# Patient Record
Sex: MALE | Race: WHITE | NOT HISPANIC OR LATINO
[De-identification: names, ages, dates, MRNs, and addresses within clinical notes are randomized per-mention and may not be internally consistent; named-entity substitution may affect disease eponyms.]

---

## 2018-01-10 ENCOUNTER — APPOINTMENT (OUTPATIENT)
Dept: HEART AND VASCULAR | Facility: CLINIC | Age: 63
End: 2018-01-10
Payer: COMMERCIAL

## 2018-01-10 VITALS
HEART RATE: 123 BPM | WEIGHT: 175 LBS | BODY MASS INDEX: 26.52 KG/M2 | HEIGHT: 68 IN | DIASTOLIC BLOOD PRESSURE: 86 MMHG | SYSTOLIC BLOOD PRESSURE: 122 MMHG

## 2018-01-10 DIAGNOSIS — Z78.9 OTHER SPECIFIED HEALTH STATUS: ICD-10-CM

## 2018-01-10 PROBLEM — Z00.00 ENCOUNTER FOR PREVENTIVE HEALTH EXAMINATION: Status: ACTIVE | Noted: 2018-01-10

## 2018-01-10 PROCEDURE — 93000 ELECTROCARDIOGRAM COMPLETE: CPT

## 2018-01-10 PROCEDURE — 99205 OFFICE O/P NEW HI 60 MIN: CPT | Mod: 25

## 2018-01-10 RX ORDER — BUPROPION HYDROCHLORIDE 150 MG/1
150 TABLET, EXTENDED RELEASE ORAL
Qty: 90 | Refills: 0 | Status: ACTIVE | COMMUNITY
Start: 2017-12-18

## 2018-01-22 ENCOUNTER — FORM ENCOUNTER (OUTPATIENT)
Age: 63
End: 2018-01-22

## 2018-01-23 ENCOUNTER — OUTPATIENT (OUTPATIENT)
Dept: OUTPATIENT SERVICES | Facility: HOSPITAL | Age: 63
LOS: 1 days | Discharge: HOME | End: 2018-01-23
Payer: COMMERCIAL

## 2018-01-23 DIAGNOSIS — F32.9 MAJOR DEPRESSIVE DISORDER, SINGLE EPISODE, UNSPECIFIED: ICD-10-CM

## 2018-01-23 DIAGNOSIS — Z98.890 OTHER SPECIFIED POSTPROCEDURAL STATES: Chronic | ICD-10-CM

## 2018-01-23 DIAGNOSIS — E78.5 HYPERLIPIDEMIA, UNSPECIFIED: ICD-10-CM

## 2018-01-23 DIAGNOSIS — G47.33 OBSTRUCTIVE SLEEP APNEA (ADULT) (PEDIATRIC): ICD-10-CM

## 2018-01-23 DIAGNOSIS — I48.92 UNSPECIFIED ATRIAL FLUTTER: ICD-10-CM

## 2018-01-23 DIAGNOSIS — I10 ESSENTIAL (PRIMARY) HYPERTENSION: ICD-10-CM

## 2018-01-23 LAB
ALBUMIN SERPL ELPH-MCNC: 5.2 G/DL — HIGH (ref 3.3–5)
ALP SERPL-CCNC: 64 U/L — SIGNIFICANT CHANGE UP (ref 40–120)
ALT FLD-CCNC: 45 U/L — SIGNIFICANT CHANGE UP (ref 10–45)
ANION GAP SERPL CALC-SCNC: 14 MMOL/L — SIGNIFICANT CHANGE UP (ref 5–17)
APTT BLD: 32.5 SEC — SIGNIFICANT CHANGE UP (ref 27.5–37.4)
AST SERPL-CCNC: 33 U/L — SIGNIFICANT CHANGE UP (ref 10–40)
BILIRUB SERPL-MCNC: 0.4 MG/DL — SIGNIFICANT CHANGE UP (ref 0.2–1.2)
BLD GP AB SCN SERPL QL: NEGATIVE — SIGNIFICANT CHANGE UP
BUN SERPL-MCNC: 15 MG/DL — SIGNIFICANT CHANGE UP (ref 7–23)
CALCIUM SERPL-MCNC: 10.2 MG/DL — SIGNIFICANT CHANGE UP (ref 8.4–10.5)
CHLORIDE SERPL-SCNC: 94 MMOL/L — LOW (ref 96–108)
CO2 SERPL-SCNC: 30 MMOL/L — SIGNIFICANT CHANGE UP (ref 22–31)
CREAT SERPL-MCNC: 0.97 MG/DL — SIGNIFICANT CHANGE UP (ref 0.5–1.3)
GLUCOSE SERPL-MCNC: 116 MG/DL — HIGH (ref 70–99)
HCT VFR BLD CALC: 47.6 % — SIGNIFICANT CHANGE UP (ref 39–50)
HGB BLD-MCNC: 16.1 G/DL — SIGNIFICANT CHANGE UP (ref 13–17)
INR BLD: 0.98 — SIGNIFICANT CHANGE UP (ref 0.88–1.16)
MCHC RBC-ENTMCNC: 32.7 PG — SIGNIFICANT CHANGE UP (ref 27–34)
MCHC RBC-ENTMCNC: 33.8 G/DL — SIGNIFICANT CHANGE UP (ref 32–36)
MCV RBC AUTO: 96.7 FL — SIGNIFICANT CHANGE UP (ref 80–100)
PLATELET # BLD AUTO: 198 K/UL — SIGNIFICANT CHANGE UP (ref 150–400)
POTASSIUM SERPL-MCNC: 4.5 MMOL/L — SIGNIFICANT CHANGE UP (ref 3.5–5.3)
POTASSIUM SERPL-SCNC: 4.5 MMOL/L — SIGNIFICANT CHANGE UP (ref 3.5–5.3)
PROT SERPL-MCNC: 8.2 G/DL — SIGNIFICANT CHANGE UP (ref 6–8.3)
PROTHROM AB SERPL-ACNC: 10.9 SEC — SIGNIFICANT CHANGE UP (ref 9.8–12.7)
RBC # BLD: 4.92 M/UL — SIGNIFICANT CHANGE UP (ref 4.2–5.8)
RBC # FLD: 12.2 % — SIGNIFICANT CHANGE UP (ref 10.3–16.9)
RH IG SCN BLD-IMP: POSITIVE — SIGNIFICANT CHANGE UP
SODIUM SERPL-SCNC: 138 MMOL/L — SIGNIFICANT CHANGE UP (ref 135–145)
WBC # BLD: 6.6 K/UL — SIGNIFICANT CHANGE UP (ref 3.8–10.5)
WBC # FLD AUTO: 6.6 K/UL — SIGNIFICANT CHANGE UP (ref 3.8–10.5)

## 2018-01-23 PROCEDURE — 80053 COMPREHEN METABOLIC PANEL: CPT

## 2018-01-23 PROCEDURE — 86901 BLOOD TYPING SEROLOGIC RH(D): CPT

## 2018-01-23 PROCEDURE — C1894: CPT

## 2018-01-23 PROCEDURE — C2630: CPT

## 2018-01-23 PROCEDURE — 86900 BLOOD TYPING SEROLOGIC ABO: CPT

## 2018-01-23 PROCEDURE — 85730 THROMBOPLASTIN TIME PARTIAL: CPT

## 2018-01-23 PROCEDURE — 93325 DOPPLER ECHO COLOR FLOW MAPG: CPT | Mod: 26

## 2018-01-23 PROCEDURE — 85610 PROTHROMBIN TIME: CPT

## 2018-01-23 PROCEDURE — 93613 INTRACARDIAC EPHYS 3D MAPG: CPT

## 2018-01-23 PROCEDURE — 93653 COMPRE EP EVAL TX SVT: CPT

## 2018-01-23 PROCEDURE — C1766: CPT

## 2018-01-23 PROCEDURE — 93623 PRGRMD STIMJ&PACG IV RX NFS: CPT

## 2018-01-23 PROCEDURE — 99234 HOSP IP/OBS SM DT SF/LOW 45: CPT

## 2018-01-23 PROCEDURE — 93312 ECHO TRANSESOPHAGEAL: CPT | Mod: 26

## 2018-01-23 PROCEDURE — 93621 COMP EP EVL L PAC&REC C SINS: CPT | Mod: 26

## 2018-01-23 PROCEDURE — 85027 COMPLETE CBC AUTOMATED: CPT

## 2018-01-23 PROCEDURE — 93623 PRGRMD STIMJ&PACG IV RX NFS: CPT | Mod: 26

## 2018-01-23 PROCEDURE — 93656 COMPRE EP EVAL ABLTJ ATR FIB: CPT

## 2018-01-23 PROCEDURE — 93320 DOPPLER ECHO COMPLETE: CPT | Mod: 26

## 2018-01-23 PROCEDURE — 93312 ECHO TRANSESOPHAGEAL: CPT

## 2018-01-23 PROCEDURE — C1730: CPT

## 2018-01-23 PROCEDURE — 86850 RBC ANTIBODY SCREEN: CPT

## 2018-01-23 RX ORDER — ATORVASTATIN CALCIUM 80 MG/1
10 TABLET, FILM COATED ORAL AT BEDTIME
Qty: 0 | Refills: 0 | Status: DISCONTINUED | OUTPATIENT
Start: 2018-01-23 | End: 2018-01-23

## 2018-01-23 RX ORDER — LOSARTAN POTASSIUM 100 MG/1
50 TABLET, FILM COATED ORAL
Qty: 0 | Refills: 0 | Status: DISCONTINUED | OUTPATIENT
Start: 2018-01-23 | End: 2018-01-23

## 2018-01-23 RX ORDER — DULOXETINE HYDROCHLORIDE 30 MG/1
90 CAPSULE, DELAYED RELEASE ORAL DAILY
Qty: 0 | Refills: 0 | Status: DISCONTINUED | OUTPATIENT
Start: 2018-01-23 | End: 2018-01-23

## 2018-01-23 RX ORDER — BUPROPION HYDROCHLORIDE 150 MG/1
150 TABLET, EXTENDED RELEASE ORAL DAILY
Qty: 0 | Refills: 0 | Status: DISCONTINUED | OUTPATIENT
Start: 2018-01-23 | End: 2018-01-23

## 2018-01-23 RX ORDER — ATENOLOL 25 MG/1
25 TABLET ORAL DAILY
Qty: 0 | Refills: 0 | Status: DISCONTINUED | OUTPATIENT
Start: 2018-01-23 | End: 2018-01-23

## 2018-01-23 RX ORDER — APIXABAN 2.5 MG/1
5 TABLET, FILM COATED ORAL
Qty: 0 | Refills: 0 | Status: DISCONTINUED | OUTPATIENT
Start: 2018-01-23 | End: 2018-01-23

## 2018-01-23 RX ORDER — FLUOXETINE HCL 10 MG
40 CAPSULE ORAL DAILY
Qty: 0 | Refills: 0 | Status: DISCONTINUED | OUTPATIENT
Start: 2018-01-23 | End: 2018-01-23

## 2018-01-23 NOTE — H&P ADULT - HISTORY OF PRESENT ILLNESS
Mr. Oliveira is a 62 year old male with sleep apnea, HTN. HLD and atrial flutter s/p cardioversion at OSH in 2015, who presents today for ablation of recurrent atrial flutter.    He states that in 2015 he had asymptomatic atrial flutter and was ultimately cardioverted. He was doing well and notes no limitations in his activity. He saw his pulmonologist last week and was found to be in atrial flutter with a rapid ventricular response. He was referred to Dr. Sams and his Atenolol was increased and he was started on Eliquis. He states while in this arrhythmia he went to the gym without any limitations. He denies any recent stress test or echocardiogram. No palpitations, syncope, near syncope, orthopnea, PND, peripheral edema. He denies fever, chills, palpitations, awareness of rapid heart action, SOB, MABRY. He endorses compliance with Eliquis and all medications. He denies bleeding issues.     01/23/18 LIU: No clot in FLORA. Normal LVEF.

## 2018-01-23 NOTE — H&P ADULT - ASSESSMENT
Mr. Oliveira is a 62 year old male with sleep apnea, HTN. HLD and atrial flutter s/p cardioversion at OSH in 2015, who presents today for ablation of recurrent atrial flutter. He saw his pulmonologist last week and was found to be in atrial flutter with a rapid ventricular response. He was unaware of any rapid heart action and feels well today. He is maintained on Eliquis 5mg BID with no bleeding issues. 01/23/18 LIU: No clot in FLORA. Normal LVEF.

## 2018-01-23 NOTE — H&P ADULT - PMH
Atrial flutter with rapid ventricular response    Essential hypertension    HLD (hyperlipidemia)    FRANCISCO on CPAP

## 2018-01-23 NOTE — H&P ADULT - NSHPLABSRESULTS_GEN_ALL_CORE
01/23/17 LIU   - Normal left ventricular size and wall thickness. The left ventricular wall motion is normal. The left ventricular ejection fraction is normal.  - The left atrium is mildly dilated.   - Right atrial size is normal.  - The right ventricle is normal in size and function. The right ventricular systolic function is normal.  - No evidence for any hemodynamically significant valvular disease.  - No clot seen in the left atrium or in the left atrial appendage. Left atrial appendage emptying velocities are normal.  - There is no pericardial effusion.  - There is no significant atherosclerotic plaque(s) in the aortic arch. There is no significant atherosclerotic plaque(s) in the descending aorta.                               16.1   6.6   )-----------( 198      ( 23 Jan 2018 08:15 )             47.6       01-23    138  |  94<L>  |  15  ----------------------------<  116<H>  4.5   |  30  |  0.97    Ca    10.2      23 Jan 2018 08:15    TPro  8.2  /  Alb  5.2<H>  /  TBili  0.4  /  DBili  x   /  AST  33  /  ALT  45  /  AlkPhos  64  01-23      PT/INR - ( 23 Jan 2018 08:15 )   PT: 10.9 sec;   INR: 0.98     PTT - ( 23 Jan 2018 08:15 )  PTT:32.5 sec

## 2018-01-23 NOTE — H&P ADULT - PROBLEM SELECTOR PLAN 1
- Patient is scheduled to undergo aflutter ablation today with Dr. Calix.  - Continue Eliquis (restart 6 hours post EP procedure) - Patient is scheduled to undergo atrial flutter ablation today with Dr. Calix.  - Continue Eliquis 5mg BID (restart 6 hours post EP procedure)

## 2018-03-05 ENCOUNTER — APPOINTMENT (OUTPATIENT)
Dept: HEART AND VASCULAR | Facility: CLINIC | Age: 63
End: 2018-03-05
Payer: COMMERCIAL

## 2018-03-05 PROCEDURE — 99213 OFFICE O/P EST LOW 20 MIN: CPT

## 2019-03-08 PROBLEM — I48.92 UNSPECIFIED ATRIAL FLUTTER: Chronic | Status: ACTIVE | Noted: 2018-01-23

## 2019-03-08 PROBLEM — E78.5 HYPERLIPIDEMIA, UNSPECIFIED: Chronic | Status: ACTIVE | Noted: 2018-01-23

## 2019-03-08 PROBLEM — I10 ESSENTIAL (PRIMARY) HYPERTENSION: Chronic | Status: ACTIVE | Noted: 2018-01-23

## 2019-03-08 PROBLEM — G47.33 OBSTRUCTIVE SLEEP APNEA (ADULT) (PEDIATRIC): Chronic | Status: ACTIVE | Noted: 2018-01-23

## 2019-03-11 ENCOUNTER — NON-APPOINTMENT (OUTPATIENT)
Age: 64
End: 2019-03-11

## 2019-03-11 ENCOUNTER — RECORD ABSTRACTING (OUTPATIENT)
Age: 64
End: 2019-03-11

## 2019-03-11 ENCOUNTER — APPOINTMENT (OUTPATIENT)
Dept: CARDIOLOGY | Facility: CLINIC | Age: 64
End: 2019-03-11
Payer: COMMERCIAL

## 2019-03-11 VITALS
SYSTOLIC BLOOD PRESSURE: 120 MMHG | WEIGHT: 176 LBS | HEART RATE: 96 BPM | BODY MASS INDEX: 26.67 KG/M2 | DIASTOLIC BLOOD PRESSURE: 80 MMHG | HEIGHT: 68 IN

## 2019-03-11 DIAGNOSIS — Z86.010 PERSONAL HISTORY OF COLONIC POLYPS: ICD-10-CM

## 2019-03-11 DIAGNOSIS — Z86.19 PERSONAL HISTORY OF OTHER INFECTIOUS AND PARASITIC DISEASES: ICD-10-CM

## 2019-03-11 DIAGNOSIS — K76.0 FATTY (CHANGE OF) LIVER, NOT ELSEWHERE CLASSIFIED: ICD-10-CM

## 2019-03-11 DIAGNOSIS — Z82.49 FAMILY HISTORY OF ISCHEMIC HEART DISEASE AND OTHER DISEASES OF THE CIRCULATORY SYSTEM: ICD-10-CM

## 2019-03-11 DIAGNOSIS — Z87.891 PERSONAL HISTORY OF NICOTINE DEPENDENCE: ICD-10-CM

## 2019-03-11 DIAGNOSIS — R74.8 ABNORMAL LEVELS OF OTHER SERUM ENZYMES: ICD-10-CM

## 2019-03-11 DIAGNOSIS — Z80.0 FAMILY HISTORY OF MALIGNANT NEOPLASM OF DIGESTIVE ORGANS: ICD-10-CM

## 2019-03-11 DIAGNOSIS — F41.9 ANXIETY DISORDER, UNSPECIFIED: ICD-10-CM

## 2019-03-11 DIAGNOSIS — J98.6 DISORDERS OF DIAPHRAGM: ICD-10-CM

## 2019-03-11 DIAGNOSIS — Z87.898 PERSONAL HISTORY OF OTHER SPECIFIED CONDITIONS: ICD-10-CM

## 2019-03-11 DIAGNOSIS — Z86.79 PERSONAL HISTORY OF OTHER DISEASES OF THE CIRCULATORY SYSTEM: ICD-10-CM

## 2019-03-11 PROCEDURE — 99213 OFFICE O/P EST LOW 20 MIN: CPT

## 2019-03-11 PROCEDURE — 93000 ELECTROCARDIOGRAM COMPLETE: CPT

## 2019-03-11 NOTE — PHYSICAL EXAM
[General Appearance - Well Developed] : well developed [Normal Appearance] : normal appearance [Well Groomed] : well groomed [General Appearance - Well Nourished] : well nourished [No Deformities] : no deformities [General Appearance - In No Acute Distress] : no acute distress [Normal Conjunctiva] : the conjunctiva exhibited no abnormalities [Eyelids - No Xanthelasma] : the eyelids demonstrated no xanthelasmas [Normal Oral Mucosa] : normal oral mucosa [No Oral Pallor] : no oral pallor [No Oral Cyanosis] : no oral cyanosis [Normal Jugular Venous A Waves Present] : normal jugular venous A waves present [Normal Jugular Venous V Waves Present] : normal jugular venous V waves present [No Jugular Venous Amezquita A Waves] : no jugular venous amezquita A waves [Respiration, Rhythm And Depth] : normal respiratory rhythm and effort [Exaggerated Use Of Accessory Muscles For Inspiration] : no accessory muscle use [Auscultation Breath Sounds / Voice Sounds] : lungs were clear to auscultation bilaterally [Heart Rate And Rhythm] : heart rate and rhythm were normal [Heart Sounds] : normal S1 and S2 [Murmurs] : no murmurs present [Abdomen Soft] : soft [Abdomen Tenderness] : non-tender [] : no hepato-splenomegaly [Abdomen Mass (___ Cm)] : no abdominal mass palpated [Abnormal Walk] : normal gait [Gait - Sufficient For Exercise Testing] : the gait was sufficient for exercise testing [Oriented To Time, Place, And Person] : oriented to person, place, and time [Affect] : the affect was normal [Mood] : the mood was normal [No Anxiety] : not feeling anxious

## 2019-03-11 NOTE — REASON FOR VISIT
[FreeTextEntry1] : The patient was recently discovered by his primary care to have had recurrence of atrial flutter. This is the first known episode following a therapeutic ablation 2 years ago. Patient had also had a successful cardioversion prior to the ablation. He was started on anticoagulation with ELIQUIS by his primary care which will be continued. The patient is completely asymptomatic and is not aware of the arrhythmia. He has had no symptoms of chest pain shortness of breath dizziness lightheadedness or palpitations

## 2019-03-11 NOTE — DISCUSSION/SUMMARY
[FreeTextEntry1] : The electrocardiogram reveals atrial flutter with moderate ventricular rate right-sided conduction delay. The patient is status post ablation in the past for atrial flutter/fibrillation. His condition is currently stable and asymptomatic. I have recommended that the issue be addressed either by repeat cardioversion or repeat consultation with his electrophysiologist in order to that the best possible therapeutic strategy. Possibly another ablation would be indicated in this case. No anticoagulation will be continued The patient will be considering the pros therapeutic options.

## 2019-04-01 ENCOUNTER — APPOINTMENT (OUTPATIENT)
Dept: HEART AND VASCULAR | Facility: CLINIC | Age: 64
End: 2019-04-01
Payer: COMMERCIAL

## 2019-04-01 VITALS
SYSTOLIC BLOOD PRESSURE: 130 MMHG | DIASTOLIC BLOOD PRESSURE: 98 MMHG | WEIGHT: 175 LBS | HEART RATE: 75 BPM | OXYGEN SATURATION: 99 % | BODY MASS INDEX: 26.52 KG/M2 | HEIGHT: 68 IN

## 2019-04-01 PROCEDURE — 99214 OFFICE O/P EST MOD 30 MIN: CPT

## 2019-04-01 RX ORDER — AMOXICILLIN 875 MG/1
TABLET, FILM COATED ORAL
Refills: 0 | Status: DISCONTINUED | COMMUNITY
End: 2019-04-01

## 2019-04-01 RX ORDER — DIGOXIN 125 MCG
0.12 TABLET ORAL
Refills: 0 | Status: DISCONTINUED | COMMUNITY
End: 2019-04-01

## 2019-04-01 RX ORDER — ASPIRIN 81 MG/1
81 TABLET, CHEWABLE ORAL
Refills: 0 | Status: DISCONTINUED | COMMUNITY
End: 2019-04-01

## 2019-04-01 NOTE — PHYSICAL EXAM
[General Appearance - Well Developed] : well developed [Normal Appearance] : normal appearance [Well Groomed] : well groomed [General Appearance - Well Nourished] : well nourished [No Deformities] : no deformities [General Appearance - In No Acute Distress] : no acute distress [Normal Conjunctiva] : the conjunctiva exhibited no abnormalities [Normal Oral Mucosa] : normal oral mucosa [Normal Jugular Venous V Waves Present] : normal jugular venous V waves present [Respiration, Rhythm And Depth] : normal respiratory rhythm and effort [Exaggerated Use Of Accessory Muscles For Inspiration] : no accessory muscle use [Auscultation Breath Sounds / Voice Sounds] : lungs were clear to auscultation bilaterally [Heart Rate And Rhythm] : heart rate and rhythm were normal [Heart Sounds] : normal S1 and S2 [Abdomen Soft] : soft [Abnormal Walk] : normal gait [Gait - Sufficient For Exercise Testing] : the gait was sufficient for exercise testing [Cyanosis, Localized] : no localized cyanosis [] : no rash [Oriented To Time, Place, And Person] : oriented to person, place, and time [Impaired Insight] : insight and judgment were intact [Affect] : the affect was normal [Mood] : the mood was normal [No Anxiety] : not feeling anxious [Normal] : normal [Apical Thrill] : no thrill palpable at the apex [Click] : no click [Pericardial Rub] : no pericardial rub

## 2019-04-01 NOTE — DISCUSSION/SUMMARY
[FreeTextEntry1] : Atypical flutter post typical flutter ablation. Explained that this is likely a left atrial procedure. Discussed options on cardioverions with/without antiarrhtyhmics and ablation. He elected ablaiton. Quoted an 80% chance for success and <1:1000 chance of a major complications. He expressed understanding and agreed to proceed with ablations. THis will be done with uninterupted anticoagulation.

## 2019-04-01 NOTE — HISTORY OF PRESENT ILLNESS
[FreeTextEntry1] : Mr. Oliveira is s/p ablation for typical flutter on 1/23/2018. He feels great, denies, palpitations, syncope, near syncope, orthopnea, PND, peripheral edema.  \par He states that his energy has significantly increased post ablation, however he was recently diagnosed with flutter during routine check. He was started on anticoagulation 3/11. Symptoms include very mild fatigue. \par   \par

## 2019-04-11 ENCOUNTER — INPATIENT (INPATIENT)
Facility: HOSPITAL | Age: 64
LOS: 0 days | Discharge: HOME | DRG: 274 | End: 2019-04-12
Attending: INTERNAL MEDICINE | Admitting: INTERNAL MEDICINE
Payer: COMMERCIAL

## 2019-04-11 VITALS — WEIGHT: 173.06 LBS | HEIGHT: 68 IN

## 2019-04-11 DIAGNOSIS — I10 ESSENTIAL (PRIMARY) HYPERTENSION: ICD-10-CM

## 2019-04-11 DIAGNOSIS — I48.91 UNSPECIFIED ATRIAL FIBRILLATION: ICD-10-CM

## 2019-04-11 DIAGNOSIS — Z98.890 OTHER SPECIFIED POSTPROCEDURAL STATES: Chronic | ICD-10-CM

## 2019-04-11 DIAGNOSIS — G47.33 OBSTRUCTIVE SLEEP APNEA (ADULT) (PEDIATRIC): ICD-10-CM

## 2019-04-11 DIAGNOSIS — E78.5 HYPERLIPIDEMIA, UNSPECIFIED: ICD-10-CM

## 2019-04-11 DIAGNOSIS — R63.8 OTHER SYMPTOMS AND SIGNS CONCERNING FOOD AND FLUID INTAKE: ICD-10-CM

## 2019-04-11 LAB
ANION GAP SERPL CALC-SCNC: 10 MMOL/L — SIGNIFICANT CHANGE UP (ref 5–17)
ANION GAP SERPL CALC-SCNC: 12 MMOL/L — SIGNIFICANT CHANGE UP (ref 5–17)
APTT BLD: 32.3 SEC — SIGNIFICANT CHANGE UP (ref 27.5–36.3)
BLD GP AB SCN SERPL QL: NEGATIVE — SIGNIFICANT CHANGE UP
BUN SERPL-MCNC: 15 MG/DL — SIGNIFICANT CHANGE UP (ref 7–23)
BUN SERPL-MCNC: 17 MG/DL — SIGNIFICANT CHANGE UP (ref 7–23)
CALCIUM SERPL-MCNC: 10.2 MG/DL — SIGNIFICANT CHANGE UP (ref 8.4–10.5)
CALCIUM SERPL-MCNC: 7.6 MG/DL — LOW (ref 8.4–10.5)
CHLORIDE SERPL-SCNC: 102 MMOL/L — SIGNIFICANT CHANGE UP (ref 96–108)
CHLORIDE SERPL-SCNC: 112 MMOL/L — HIGH (ref 96–108)
CO2 SERPL-SCNC: 23 MMOL/L — SIGNIFICANT CHANGE UP (ref 22–31)
CO2 SERPL-SCNC: 27 MMOL/L — SIGNIFICANT CHANGE UP (ref 22–31)
CREAT SERPL-MCNC: 0.92 MG/DL — SIGNIFICANT CHANGE UP (ref 0.5–1.3)
CREAT SERPL-MCNC: 1.03 MG/DL — SIGNIFICANT CHANGE UP (ref 0.5–1.3)
GLUCOSE SERPL-MCNC: 116 MG/DL — HIGH (ref 70–99)
GLUCOSE SERPL-MCNC: 129 MG/DL — HIGH (ref 70–99)
HCT VFR BLD CALC: 33.4 % — LOW (ref 39–50)
HCT VFR BLD CALC: 35.5 % — LOW (ref 39–50)
HCT VFR BLD CALC: 48.8 % — SIGNIFICANT CHANGE UP (ref 39–50)
HGB BLD-MCNC: 11.1 G/DL — LOW (ref 13–17)
HGB BLD-MCNC: 11.8 G/DL — LOW (ref 13–17)
HGB BLD-MCNC: 16.1 G/DL — SIGNIFICANT CHANGE UP (ref 13–17)
INR BLD: 1.12 — SIGNIFICANT CHANGE UP (ref 0.88–1.16)
LACTATE SERPL-SCNC: 1.2 MMOL/L — SIGNIFICANT CHANGE UP (ref 0.5–2)
MAGNESIUM SERPL-MCNC: 1.6 MG/DL — SIGNIFICANT CHANGE UP (ref 1.6–2.6)
MCHC RBC-ENTMCNC: 33 GM/DL — SIGNIFICANT CHANGE UP (ref 32–36)
MCHC RBC-ENTMCNC: 33.2 GM/DL — SIGNIFICANT CHANGE UP (ref 32–36)
MCHC RBC-ENTMCNC: 33.2 GM/DL — SIGNIFICANT CHANGE UP (ref 32–36)
MCHC RBC-ENTMCNC: 33.5 PG — SIGNIFICANT CHANGE UP (ref 27–34)
MCHC RBC-ENTMCNC: 34.3 PG — HIGH (ref 27–34)
MCHC RBC-ENTMCNC: 34.4 PG — HIGH (ref 27–34)
MCV RBC AUTO: 101.7 FL — HIGH (ref 80–100)
MCV RBC AUTO: 103.1 FL — HIGH (ref 80–100)
MCV RBC AUTO: 103.5 FL — HIGH (ref 80–100)
NRBC # BLD: 0 /100 WBCS — SIGNIFICANT CHANGE UP (ref 0–0)
PLATELET # BLD AUTO: 141 K/UL — LOW (ref 150–400)
PLATELET # BLD AUTO: 148 K/UL — LOW (ref 150–400)
PLATELET # BLD AUTO: 216 K/UL — SIGNIFICANT CHANGE UP (ref 150–400)
POTASSIUM SERPL-MCNC: 4.4 MMOL/L — SIGNIFICANT CHANGE UP (ref 3.5–5.3)
POTASSIUM SERPL-MCNC: 4.9 MMOL/L — SIGNIFICANT CHANGE UP (ref 3.5–5.3)
POTASSIUM SERPL-SCNC: 4.4 MMOL/L — SIGNIFICANT CHANGE UP (ref 3.5–5.3)
POTASSIUM SERPL-SCNC: 4.9 MMOL/L — SIGNIFICANT CHANGE UP (ref 3.5–5.3)
PROTHROM AB SERPL-ACNC: 12.7 SEC — SIGNIFICANT CHANGE UP (ref 10–12.9)
RBC # BLD: 3.24 M/UL — LOW (ref 4.2–5.8)
RBC # BLD: 3.43 M/UL — LOW (ref 4.2–5.8)
RBC # BLD: 4.8 M/UL — SIGNIFICANT CHANGE UP (ref 4.2–5.8)
RBC # FLD: 12.2 % — SIGNIFICANT CHANGE UP (ref 10.3–14.5)
RBC # FLD: 12.3 % — SIGNIFICANT CHANGE UP (ref 10.3–14.5)
RBC # FLD: 12.4 % — SIGNIFICANT CHANGE UP (ref 10.3–14.5)
RH IG SCN BLD-IMP: POSITIVE — SIGNIFICANT CHANGE UP
SODIUM SERPL-SCNC: 141 MMOL/L — SIGNIFICANT CHANGE UP (ref 135–145)
SODIUM SERPL-SCNC: 145 MMOL/L — SIGNIFICANT CHANGE UP (ref 135–145)
WBC # BLD: 6.31 K/UL — SIGNIFICANT CHANGE UP (ref 3.8–10.5)
WBC # BLD: 6.71 K/UL — SIGNIFICANT CHANGE UP (ref 3.8–10.5)
WBC # BLD: 9.42 K/UL — SIGNIFICANT CHANGE UP (ref 3.8–10.5)
WBC # FLD AUTO: 6.31 K/UL — SIGNIFICANT CHANGE UP (ref 3.8–10.5)
WBC # FLD AUTO: 6.71 K/UL — SIGNIFICANT CHANGE UP (ref 3.8–10.5)
WBC # FLD AUTO: 9.42 K/UL — SIGNIFICANT CHANGE UP (ref 3.8–10.5)

## 2019-04-11 PROCEDURE — 93653 COMPRE EP EVAL TX SVT: CPT

## 2019-04-11 PROCEDURE — 93662 INTRACARDIAC ECG (ICE): CPT | Mod: 26

## 2019-04-11 PROCEDURE — 99223 1ST HOSP IP/OBS HIGH 75: CPT

## 2019-04-11 PROCEDURE — 93613 INTRACARDIAC EPHYS 3D MAPG: CPT

## 2019-04-11 PROCEDURE — 74176 CT ABD & PELVIS W/O CONTRAST: CPT | Mod: 26

## 2019-04-11 PROCEDURE — 93621 COMP EP EVL L PAC&REC C SINS: CPT | Mod: 26

## 2019-04-11 PROCEDURE — 93462 L HRT CATH TRNSPTL PUNCTURE: CPT

## 2019-04-11 PROCEDURE — 93655 ICAR CATH ABLTJ DSCRT ARRHYT: CPT

## 2019-04-11 PROCEDURE — 93010 ELECTROCARDIOGRAM REPORT: CPT

## 2019-04-11 RX ORDER — ATENOLOL 25 MG/1
25 TABLET ORAL DAILY
Qty: 0 | Refills: 0 | Status: DISCONTINUED | OUTPATIENT
Start: 2019-04-11 | End: 2019-04-11

## 2019-04-11 RX ORDER — APIXABAN 2.5 MG/1
1 TABLET, FILM COATED ORAL
Qty: 0 | Refills: 0 | COMMUNITY

## 2019-04-11 RX ORDER — FLUOXETINE HCL 10 MG
1 CAPSULE ORAL
Qty: 0 | Refills: 0 | COMMUNITY

## 2019-04-11 RX ORDER — SODIUM CHLORIDE 9 MG/ML
1000 INJECTION INTRAMUSCULAR; INTRAVENOUS; SUBCUTANEOUS ONCE
Qty: 0 | Refills: 0 | Status: COMPLETED | OUTPATIENT
Start: 2019-04-11 | End: 2019-04-11

## 2019-04-11 RX ORDER — APIXABAN 2.5 MG/1
5 TABLET, FILM COATED ORAL
Qty: 0 | Refills: 0 | Status: DISCONTINUED | OUTPATIENT
Start: 2019-04-11 | End: 2019-04-11

## 2019-04-11 RX ORDER — ATORVASTATIN CALCIUM 80 MG/1
1 TABLET, FILM COATED ORAL
Qty: 0 | Refills: 0 | COMMUNITY

## 2019-04-11 RX ORDER — BUPROPION HYDROCHLORIDE 150 MG/1
1 TABLET, EXTENDED RELEASE ORAL
Qty: 0 | Refills: 0 | COMMUNITY

## 2019-04-11 RX ORDER — LOSARTAN POTASSIUM 100 MG/1
1 TABLET, FILM COATED ORAL
Qty: 0 | Refills: 0 | COMMUNITY

## 2019-04-11 RX ORDER — PANTOPRAZOLE SODIUM 20 MG/1
40 TABLET, DELAYED RELEASE ORAL
Qty: 0 | Refills: 0 | Status: DISCONTINUED | OUTPATIENT
Start: 2019-04-11 | End: 2019-04-12

## 2019-04-11 RX ORDER — FLUOXETINE HCL 10 MG
40 CAPSULE ORAL DAILY
Qty: 0 | Refills: 0 | Status: DISCONTINUED | OUTPATIENT
Start: 2019-04-11 | End: 2019-04-12

## 2019-04-11 RX ORDER — LOSARTAN POTASSIUM 100 MG/1
50 TABLET, FILM COATED ORAL DAILY
Qty: 0 | Refills: 0 | Status: DISCONTINUED | OUTPATIENT
Start: 2019-04-11 | End: 2019-04-11

## 2019-04-11 RX ORDER — BUPROPION HYDROCHLORIDE 150 MG/1
150 TABLET, EXTENDED RELEASE ORAL DAILY
Qty: 0 | Refills: 0 | Status: DISCONTINUED | OUTPATIENT
Start: 2019-04-11 | End: 2019-04-12

## 2019-04-11 RX ORDER — ATORVASTATIN CALCIUM 80 MG/1
10 TABLET, FILM COATED ORAL DAILY
Qty: 0 | Refills: 0 | Status: DISCONTINUED | OUTPATIENT
Start: 2019-04-11 | End: 2019-04-12

## 2019-04-11 RX ORDER — MAGNESIUM SULFATE 500 MG/ML
4 VIAL (ML) INJECTION ONCE
Qty: 0 | Refills: 0 | Status: COMPLETED | OUTPATIENT
Start: 2019-04-11 | End: 2019-04-11

## 2019-04-11 RX ORDER — ATENOLOL 25 MG/1
1 TABLET ORAL
Qty: 0 | Refills: 0 | COMMUNITY

## 2019-04-11 RX ORDER — PHENYLEPHRINE HYDROCHLORIDE 10 MG/ML
0.3 INJECTION INTRAVENOUS
Qty: 40 | Refills: 0 | Status: DISCONTINUED | OUTPATIENT
Start: 2019-04-11 | End: 2019-04-12

## 2019-04-11 RX ADMIN — SODIUM CHLORIDE 4000 MILLILITER(S): 9 INJECTION INTRAMUSCULAR; INTRAVENOUS; SUBCUTANEOUS at 18:25

## 2019-04-11 RX ADMIN — Medication 100 GRAM(S): at 21:19

## 2019-04-11 NOTE — H&P ADULT - ASSESSMENT
64 yo M with history of HTN, HLD, anxiety, sleep apnea,  atrial flutter, s/p ablation on 1/23/2018 was noted to be in atrial fibrillation on a routine medical follow up.  Today patient presents for an ablation.

## 2019-04-11 NOTE — H&P ADULT - NSICDXPASTMEDICALHX_GEN_ALL_CORE_FT
PAST MEDICAL HISTORY:  Atrial flutter with rapid ventricular response     Essential hypertension     HLD (hyperlipidemia)     FRANCISCO on CPAP

## 2019-04-11 NOTE — PROGRESS NOTE ADULT - PROBLEM SELECTOR PLAN 5
F: s/p 2L NS boluses   E: Replete for K<4 Mag<2  N: DASH/TLC diet  A: Eliquis      FULL CODE  Dispo: CCU

## 2019-04-11 NOTE — PROGRESS NOTE ADULT - SUBJECTIVE AND OBJECTIVE BOX
HOSPITAL COURSE:  63yoM with PMH of HTN, HLD, Anxiety, FRANCISCO, Aflutter, and recent Afib admitted 4/11 for planned ablation. Procedure with no complications patient requiring phenylephrine infusion during procedure as per anesthesiologist. Patient post-procedure was stable asymptomatic. Patient arrived s/p ablation with Anesthesiologist/nurses at bedside, and found to have BP 70/40s seen on Arterial line (afebrile, HR 70s, saturating 100% on 2L NC). Patient AAOx3, warm well perfused throughout, denied any current symptoms of LTH, dizziness, palpitations, SOB, pain at the groin sites bilaterally, or abdominal pain. Pt did endorse he did not urinate since yesterday (with [place] cath in found to have >300cc urine). Patient given 1L NS bolus in pressurized bag and given bolus/started on phenylephrine infusion with improvement in BPs 100/60s. Etiology, patient likely hypovolemic in setting of decreased PO intake as well as effects from general anesthesia. Plan to continue monitoring BPs closely and obtained cbc, bmp, mag, lactate and EKG.    VITAL SIGNS:  Vital Signs Last 24 Hrs  T(C): 35.3 (11 Apr 2019 17:42), Max: 35.3 (11 Apr 2019 17:42)  T(F): 95.6 (11 Apr 2019 17:42), Max: 95.6 (11 Apr 2019 17:42)  HR: 80 (11 Apr 2019 17:42) (80 - 80)  BP: 67/48 (11 Apr 2019 17:42) (67/48 - 67/48)  BP(mean): 53 (11 Apr 2019 17:42) (53 - 53)  RR: 14 (11 Apr 2019 17:42) (14 - 14)  SpO2: 94% (11 Apr 2019 17:42) (94% - 94%)    PHYSICAL EXAM:  General: Elderly  male in NAD  HEENT: NC/AT; PERRL, mucus membranes dry   Neck: supple  Cardiovascular: +S1/S2, RRR no murmurs appreciated   Respiratory: scattered ronchi on left   Gastrointestinal: soft, NT/ND; +BSx4; b/l groin sites nontender no hematoma appreciated   Extremities: WWP; no edema, b/l   Vascular: 2+ radial, DP pulses B/L  Neurological: AAOx3; no focal deficits    MEDICATIONS:  MEDICATIONS  (STANDING):  apixaban 5 milliGRAM(s) Oral two times a day  ATENolol  Tablet 25 milliGRAM(s) Oral daily  atorvastatin 10 milliGRAM(s) Oral daily  buPROPion XL . 150 milliGRAM(s) Oral daily  FLUoxetine 40 milliGRAM(s) Oral daily  losartan 50 milliGRAM(s) Oral daily  pantoprazole    Tablet 40 milliGRAM(s) Oral before breakfast  phenylephrine    Infusion 0.3 MICROgram(s)/kG/Min (8.831 mL/Hr) IV Continuous <Continuous>  sodium chloride 0.9% Bolus 1000 milliLiter(s) IV Bolus once    MEDICATIONS  (PRN):      ALLERGIES:  Allergies    No Known Allergies    Intolerances        LABS:                        16.1   6.31  )-----------( 216      ( 11 Apr 2019 12:25 )             48.8     04-11    141  |  102  |  17  ----------------------------<  129<H>  4.9   |  27  |  1.03    Ca    10.2      11 Apr 2019 12:25      PT/INR - ( 11 Apr 2019 12:25 )   PT: 12.7 sec;   INR: 1.12          PTT - ( 11 Apr 2019 12:25 )  PTT:32.3 sec    CAPILLARY BLOOD GLUCOSE          RADIOLOGY & ADDITIONAL TESTS: Reviewed. HOSPITAL COURSE:  63yoM with PMH of HTN, HLD, Anxiety, FRANCISCO, Aflutter, and recent Afib admitted 4/11 for planned ablation. Procedure with no complications patient requiring phenylephrine infusion during procedure as per anesthesiologist. Patient post-procedure was stable asymptomatic. Patient arrived s/p ablation with Anesthesiologist/nurses at bedside, and found to have BP 70/40s seen on Arterial line (afebrile, HR 70s, saturating 100% on 2L NC). Patient AAOx3, warm well perfused throughout, denied any current symptoms of LTH, dizziness, palpitations, SOB, pain at the groin sites bilaterally, or abdominal pain. Pt did endorse he did not urinate since yesterday (with [place] cath in found to have >300cc urine). Patient given 1L NS bolus in pressurized bag and given bolus/started on phenylephrine infusion with improvement in BPs 100/60s. Etiology, patient likely hypovolemic in setting of decreased PO intake as well as effects from general anesthesia. Plan to continue monitoring BPs closely and obtained cbc, bmp, mag, lactate and EKG.    VITAL SIGNS:  Vital Signs Last 24 Hrs  T(C): 35.3 (11 Apr 2019 17:42), Max: 35.3 (11 Apr 2019 17:42)  T(F): 95.6 (11 Apr 2019 17:42), Max: 95.6 (11 Apr 2019 17:42)  HR: 80 (11 Apr 2019 17:42) (80 - 80)  BP: 67/48 (11 Apr 2019 17:42) (67/48 - 67/48)  BP(mean): 53 (11 Apr 2019 17:42) (53 - 53)  RR: 14 (11 Apr 2019 17:42) (14 - 14)  SpO2: 94% (11 Apr 2019 17:42) (94% - 94%)    PHYSICAL EXAM:  General: Elderly  male in NAD  HEENT: NC/AT; PERRL, mucus membranes dry   Neck: supple  Cardiovascular: +S1/S2, RRR no murmurs appreciated   Respiratory: scattered ronchi on left   Gastrointestinal: soft, NT/ND; +BSx4; b/l groin sites nontender no hematoma appreciated   : [place] cath in place no current urine   Extremities: WWP; no edema, b/l   Vascular: 2+ radial, DP pulses B/L  Neurological: AAOx3; no focal deficits    MEDICATIONS:  MEDICATIONS  (STANDING):  apixaban 5 milliGRAM(s) Oral two times a day  ATENolol  Tablet 25 milliGRAM(s) Oral daily  atorvastatin 10 milliGRAM(s) Oral daily  buPROPion XL . 150 milliGRAM(s) Oral daily  FLUoxetine 40 milliGRAM(s) Oral daily  losartan 50 milliGRAM(s) Oral daily  pantoprazole    Tablet 40 milliGRAM(s) Oral before breakfast  phenylephrine    Infusion 0.3 MICROgram(s)/kG/Min (8.831 mL/Hr) IV Continuous <Continuous>  sodium chloride 0.9% Bolus 1000 milliLiter(s) IV Bolus once    MEDICATIONS  (PRN):      ALLERGIES:  Allergies    No Known Allergies    Intolerances        LABS:                        16.1   6.31  )-----------( 216      ( 11 Apr 2019 12:25 )             48.8     04-11    141  |  102  |  17  ----------------------------<  129<H>  4.9   |  27  |  1.03    Ca    10.2      11 Apr 2019 12:25      PT/INR - ( 11 Apr 2019 12:25 )   PT: 12.7 sec;   INR: 1.12          PTT - ( 11 Apr 2019 12:25 )  PTT:32.3 sec    CAPILLARY BLOOD GLUCOSE          RADIOLOGY & ADDITIONAL TESTS: Reviewed.

## 2019-04-11 NOTE — H&P ADULT - HISTORY OF PRESENT ILLNESS
62 yo M with history of HTN, HLD, anxiety, sleep apnea,  atrial flutter, s/p ablation on 1/23/2018 was noted to be in atrial fibrillation on a routine medical follow up.  Diagnosis was confirmed by ECG. Today patient presents for an ablation.    He feels great, denies, palpitations, syncope, near syncope, orthopnea, PND, peripheral edema.     He was started on anticoagulation 3/11, he has been compliant with his medications.  Symptoms include very mild fatigue.

## 2019-04-11 NOTE — CHART NOTE - NSCHARTNOTEFT_GEN_A_CORE
63yoM with PMH of HTN, HLD, Anxiety, FRANCISCO, Aflutter, and recent Afib admitted/ s/p planned ablation> 63yoM with PMH of HTN, HLD, Anxiety, FRANCISCO, Aflutter, and recent Afib admitted/ s/p planned ablation.    5:40pm: Patient arrived s/p ablation with Anesthesiologist/nurses at bedside, and found to have BP 70/40s seen on Arterial line (afebrile, HR 70s, saturating 100% on 2L NC). Patient AAOx3, warm well perfused throughout, denied any current symptoms of LTH, dizziness, palpitations, SOB, pain at the groin sites bilaterally, or abdominal pain. Pt did endorse he did not urinate since yesterday (with [place] cath in found to have >300cc urine). Patient given 1L NS bolus in pressurized bag and given bolus/started on phenylephrine infusion with improvement in BPs 100/60s. Etiology, patient likely hypovolemic in setting of decreased PO intake as well as effects from general anesthesia. Plan to continue monitoring BPs closely and obtained cbc, bmp, mag, lactate and EKG.

## 2019-04-11 NOTE — H&P ADULT - NSICDXFAMILYHX_GEN_ALL_CORE_FT
FAMILY HISTORY:  Father  Still living? Unknown  Family history of early CAD, Age at diagnosis: Age Unknown

## 2019-04-11 NOTE — PROGRESS NOTE ADULT - ASSESSMENT
63yoM with PMH of HTN, HLD, Anxiety, FRANCISCO, Aflutter, and recent Afib admitted 4/11 for planned ablation with course c/b post hypotension requiring IVF and phenylephrine infusion.

## 2019-04-11 NOTE — H&P ADULT - PROBLEM SELECTOR PLAN 1
EPS/ablation today, will monitor on telemetry overnight, continue atenolol   Eliquis tonight, bed rest

## 2019-04-11 NOTE — PROGRESS NOTE ADULT - PROBLEM SELECTOR PLAN 1
PMH of Aflutter, with recent Afib; S/p planned ablation 4/11 with post procedure EKG NSR nL axis and RBBB.   - F/u EP recs   - bed rest 6 hrs until 12am   - monitor groin sites   - TOV (with [place] sebastian cath)  - Eliquis tonight 1030pm    #Hypotension  Pt with sBPs 70s s/p 1L NS bolus in pressurized bag and given bolus/started on phenylephrine infusion with improvement in BPs 100/60s. Etiology, patient likely hypovolemic in setting of decreased PO intake as well as effects from general anesthesia.   - Patient again sBP 60s while on phenylephrine gtt, given 2nd 1L NS bolus and increased drip rate with improvement   - Lactate 1.2   - Likely not an RP bleed as patient with no pain, but will keep in differential

## 2019-04-12 ENCOUNTER — TRANSCRIPTION ENCOUNTER (OUTPATIENT)
Age: 64
End: 2019-04-12

## 2019-04-12 VITALS
RESPIRATION RATE: 27 BRPM | OXYGEN SATURATION: 97 % | DIASTOLIC BLOOD PRESSURE: 64 MMHG | HEART RATE: 88 BPM | SYSTOLIC BLOOD PRESSURE: 93 MMHG

## 2019-04-12 LAB
ANION GAP SERPL CALC-SCNC: 9 MMOL/L — SIGNIFICANT CHANGE UP (ref 5–17)
BUN SERPL-MCNC: 13 MG/DL — SIGNIFICANT CHANGE UP (ref 7–23)
CALCIUM SERPL-MCNC: 7.8 MG/DL — LOW (ref 8.4–10.5)
CHLORIDE SERPL-SCNC: 106 MMOL/L — SIGNIFICANT CHANGE UP (ref 96–108)
CO2 SERPL-SCNC: 24 MMOL/L — SIGNIFICANT CHANGE UP (ref 22–31)
CREAT SERPL-MCNC: 0.86 MG/DL — SIGNIFICANT CHANGE UP (ref 0.5–1.3)
GLUCOSE SERPL-MCNC: 112 MG/DL — HIGH (ref 70–99)
HCT VFR BLD CALC: 34 % — LOW (ref 39–50)
HCV AB S/CO SERPL IA: 0.05 S/CO — SIGNIFICANT CHANGE UP
HCV AB SERPL-IMP: SIGNIFICANT CHANGE UP
HGB BLD-MCNC: 11.2 G/DL — LOW (ref 13–17)
MAGNESIUM SERPL-MCNC: 2.5 MG/DL — SIGNIFICANT CHANGE UP (ref 1.6–2.6)
MCHC RBC-ENTMCNC: 32.9 GM/DL — SIGNIFICANT CHANGE UP (ref 32–36)
MCHC RBC-ENTMCNC: 34.5 PG — HIGH (ref 27–34)
MCV RBC AUTO: 104.6 FL — HIGH (ref 80–100)
NRBC # BLD: 0 /100 WBCS — SIGNIFICANT CHANGE UP (ref 0–0)
PLATELET # BLD AUTO: 123 K/UL — LOW (ref 150–400)
POTASSIUM SERPL-MCNC: 4.3 MMOL/L — SIGNIFICANT CHANGE UP (ref 3.5–5.3)
POTASSIUM SERPL-SCNC: 4.3 MMOL/L — SIGNIFICANT CHANGE UP (ref 3.5–5.3)
RBC # BLD: 3.25 M/UL — LOW (ref 4.2–5.8)
RBC # FLD: 12.4 % — SIGNIFICANT CHANGE UP (ref 10.3–14.5)
SODIUM SERPL-SCNC: 139 MMOL/L — SIGNIFICANT CHANGE UP (ref 135–145)
WBC # BLD: 6.99 K/UL — SIGNIFICANT CHANGE UP (ref 3.8–10.5)
WBC # FLD AUTO: 6.99 K/UL — SIGNIFICANT CHANGE UP (ref 3.8–10.5)

## 2019-04-12 PROCEDURE — 93306 TTE W/DOPPLER COMPLETE: CPT | Mod: 26

## 2019-04-12 RX ORDER — DULOXETINE HYDROCHLORIDE 30 MG/1
90 CAPSULE, DELAYED RELEASE ORAL
Qty: 0 | Refills: 0 | COMMUNITY

## 2019-04-12 RX ORDER — ACETAMINOPHEN 500 MG
650 TABLET ORAL ONCE
Qty: 0 | Refills: 0 | Status: COMPLETED | OUTPATIENT
Start: 2019-04-12 | End: 2019-04-12

## 2019-04-12 RX ORDER — APIXABAN 2.5 MG/1
5 TABLET, FILM COATED ORAL EVERY 12 HOURS
Qty: 0 | Refills: 0 | Status: DISCONTINUED | OUTPATIENT
Start: 2019-04-12 | End: 2019-04-12

## 2019-04-12 RX ORDER — DIAZEPAM 5 MG
1 TABLET ORAL
Qty: 0 | Refills: 0 | COMMUNITY

## 2019-04-12 RX ADMIN — ATORVASTATIN CALCIUM 10 MILLIGRAM(S): 80 TABLET, FILM COATED ORAL at 12:59

## 2019-04-12 RX ADMIN — Medication 650 MILLIGRAM(S): at 12:42

## 2019-04-12 RX ADMIN — APIXABAN 5 MILLIGRAM(S): 2.5 TABLET, FILM COATED ORAL at 04:37

## 2019-04-12 RX ADMIN — PANTOPRAZOLE SODIUM 40 MILLIGRAM(S): 20 TABLET, DELAYED RELEASE ORAL at 05:11

## 2019-04-12 RX ADMIN — Medication 650 MILLIGRAM(S): at 12:12

## 2019-04-12 RX ADMIN — Medication 40 MILLIGRAM(S): at 12:12

## 2019-04-12 NOTE — DISCHARGE NOTE PROVIDER - HOSPITAL COURSE
63yoM with PMH of HTN, HLD, Anxiety, FRANCISCO, Aflutter, and recent Afib admitted 4/11 for planned ablation. Procedure with no complications patient requiring phenylephrine infusion during procedure as per anesthesiologist. Patient post-procedure was stable asymptomatic. Patient arrived s/p ablation with Anesthesiologist/nurses at bedside, and found to have BP 70/40s seen on Arterial line (afebrile, HR 70s, saturating 100% on 2L NC). Patient AAOx3, warm well perfused throughout, denied any current symptoms of LTH, dizziness, palpitations, SOB, pain at the groin sites bilaterally, or abdominal pain. Pt did endorse he did not urinate since yesterday (with [place] cath in found to have >300cc urine). Patient given 1L NS bolus in pressurized bag and given bolus/started on phenylephrine infusion with improvement in BPs 100/60s. Etiology, patient likely hypovolemic in setting of decreased PO intake as well as effects from general anesthesia. Hb then downtrended from 16.1 to 11.1 concerning for RP bleed s/p CTa/p neg, likely dilutional. Hypotension resolved s/p phenylephrine infusion d/c. Patient remained with vital signs stable, discharged home with plan to follow up with EP. 63yoM with PMH of HTN, HLD, Anxiety, FRANCISCO, Aflutter, and recent Afib admitted 4/11 for planned ablation. Procedure with no complications patient requiring phenylephrine infusion during procedure as per anesthesiologist. Patient post-procedure was stable asymptomatic. Patient arrived s/p ablation with Anesthesiologist/nurses at bedside, and found to have BP 70/40s seen on Arterial line (afebrile, HR 70s, saturating 100% on 2L NC). Patient AAOx3, warm well perfused throughout, denied any current symptoms of LTH, dizziness, palpitations, SOB, pain at the groin sites bilaterally, or abdominal pain. Pt did endorse he did not urinate since yesterday (with [place] cath in found to have >300cc urine). Patient given 1L NS bolus in pressurized bag and given bolus/started on phenylephrine infusion with improvement in BPs 100/60s. Etiology, patient likely hypovolemic in setting of decreased PO intake as well as effects from general anesthesia. Hb then downtrended from 16.1 to 11.1 concerning for RP bleed s/p CTa/p neg, likely dilutional. Hypotension resolved s/p phenylephrine infusion d/c. Echo illustrating small PFO. Patient remained with vital signs stable, discharged home with plan to follow up with EP.

## 2019-04-12 NOTE — DISCHARGE NOTE PROVIDER - CARE PROVIDER_API CALL
Krishna Calix)  Cardiac Electrophysiology; Cardiology; Cardiovascular Disease  [address], [address] [place] [address]  Phone: [phone]  Fax: [phone]  Follow Up Time:

## 2019-04-12 NOTE — DISCHARGE NOTE NURSING/CASE MANAGEMENT/SOCIAL WORK - NSDCDPATPORTLINK_GEN_ALL_CORE
You can access the [facility] Portal, offered by [facility], by registering with the following website: [email or web address]

## 2019-04-12 NOTE — DISCHARGE NOTE PROVIDER - NSDCCPCAREPLAN_GEN_ALL_CORE_FT
PRINCIPAL DISCHARGE DIAGNOSIS  Diagnosis: Atrial fibrillation and flutter  Assessment and Plan of Treatment: You have history of Atrial fibrillation and flutter for which you required ablation. Please continue taking eliquis 5 twice daily and follow up with EP and cardiologist upon discharge for further monitoring. You are on a blood thinner, therefore if you begin to have black or red stool please notify your doctor.      SECONDARY DISCHARGE DIAGNOSES  Diagnosis: HLD (hyperlipidemia)  Assessment and Plan of Treatment: You have history of HLD (hyperlipidemia) therefore please continue lipitor compliantly and continue with low cholesterol diet and daily exercise.    Diagnosis: FRANCISCO on CPAP  Assessment and Plan of Treatment: You have history of FRANCISCO, therefore please follow up with pulmonology on outpatient setting for BIPAP and further monitoring.    Diagnosis: Anxiety  Assessment and Plan of Treatment: You have past history of anxiety disorder, therefore please continue home medications and follow up with your doctor.    Diagnosis: Essential hypertension  Assessment and Plan of Treatment: You have history of Essential hypertension, therefore please continue home medications and monitoring with your doctor as well as annual eye exam. PRINCIPAL DISCHARGE DIAGNOSIS  Diagnosis: Atrial fibrillation and flutter  Assessment and Plan of Treatment: You have history of Atrial fibrillation and flutter for which you required ablation. Please continue taking eliquis 5 twice daily and follow up with EP and cardiologist upon discharge for further monitoring. You are on a blood thinner, therefore if you begin to have black or red stool please notify your doctor. Echocardiogram illustrated a small patent foramen ovale (PFO), therefore please follow up as well.      SECONDARY DISCHARGE DIAGNOSES  Diagnosis: HLD (hyperlipidemia)  Assessment and Plan of Treatment: You have history of HLD (hyperlipidemia) therefore please continue lipitor compliantly and continue with low cholesterol diet and daily exercise.    Diagnosis: FRANCISCO on CPAP  Assessment and Plan of Treatment: You have history of FRANCISCO, therefore please follow up with pulmonology on outpatient setting for BIPAP and further monitoring.    Diagnosis: Anxiety  Assessment and Plan of Treatment: You have past history of anxiety disorder, therefore please continue home medications and follow up with your doctor.    Diagnosis: Essential hypertension  Assessment and Plan of Treatment: You have history of Essential hypertension, therefore please continue home medications and monitoring with your doctor as well as annual eye exam.

## 2019-04-13 ENCOUNTER — RX CHANGE (OUTPATIENT)
Age: 64
End: 2019-04-13

## 2019-04-13 ENCOUNTER — EMERGENCY (EMERGENCY)
Facility: HOSPITAL | Age: 64
LOS: 1 days | Discharge: HOME | End: 2019-04-13
Attending: EMERGENCY MEDICINE | Admitting: EMERGENCY MEDICINE
Payer: COMMERCIAL

## 2019-04-13 VITALS
OXYGEN SATURATION: 96 % | DIASTOLIC BLOOD PRESSURE: 82 MMHG | HEART RATE: 80 BPM | RESPIRATION RATE: 17 BRPM | SYSTOLIC BLOOD PRESSURE: 128 MMHG | TEMPERATURE: 98 F

## 2019-04-13 VITALS
SYSTOLIC BLOOD PRESSURE: 130 MMHG | HEART RATE: 86 BPM | DIASTOLIC BLOOD PRESSURE: 91 MMHG | TEMPERATURE: 97 F | RESPIRATION RATE: 18 BRPM | OXYGEN SATURATION: 94 %

## 2019-04-13 DIAGNOSIS — Z98.890 OTHER SPECIFIED POSTPROCEDURAL STATES: Chronic | ICD-10-CM

## 2019-04-13 LAB
ANION GAP SERPL CALC-SCNC: 10 MMOL/L — SIGNIFICANT CHANGE UP (ref 5–17)
BASOPHILS # BLD AUTO: 0.02 K/UL — SIGNIFICANT CHANGE UP (ref 0–0.2)
BASOPHILS NFR BLD AUTO: 0.2 % — SIGNIFICANT CHANGE UP (ref 0–2)
BUN SERPL-MCNC: 10 MG/DL — SIGNIFICANT CHANGE UP (ref 7–23)
BUN SERPL-MCNC: 10 MG/DL — SIGNIFICANT CHANGE UP (ref 7–23)
CALCIUM SERPL-MCNC: 8.2 MG/DL — LOW (ref 8.4–10.5)
CALCIUM SERPL-MCNC: 8.4 MG/DL — SIGNIFICANT CHANGE UP (ref 8.4–10.5)
CHLORIDE SERPL-SCNC: 98 MMOL/L — SIGNIFICANT CHANGE UP (ref 96–108)
CHLORIDE SERPL-SCNC: 98 MMOL/L — SIGNIFICANT CHANGE UP (ref 96–108)
CO2 SERPL-SCNC: 24 MMOL/L — SIGNIFICANT CHANGE UP (ref 22–31)
CO2 SERPL-SCNC: 25 MMOL/L — SIGNIFICANT CHANGE UP (ref 22–31)
CREAT SERPL-MCNC: 0.81 MG/DL — SIGNIFICANT CHANGE UP (ref 0.5–1.3)
CREAT SERPL-MCNC: 0.86 MG/DL — SIGNIFICANT CHANGE UP (ref 0.5–1.3)
EOSINOPHIL # BLD AUTO: 0.21 K/UL — SIGNIFICANT CHANGE UP (ref 0–0.5)
EOSINOPHIL NFR BLD AUTO: 2.2 % — SIGNIFICANT CHANGE UP (ref 0–6)
EXTRA BLUE TOP TUBE: SIGNIFICANT CHANGE UP
GLUCOSE SERPL-MCNC: 102 MG/DL — HIGH (ref 70–99)
GLUCOSE SERPL-MCNC: 98 MG/DL — SIGNIFICANT CHANGE UP (ref 70–99)
HCT VFR BLD CALC: 36.1 % — LOW (ref 39–50)
HGB BLD-MCNC: 12.1 G/DL — LOW (ref 13–17)
IMM GRANULOCYTES NFR BLD AUTO: 0.4 % — SIGNIFICANT CHANGE UP (ref 0–1.5)
LYMPHOCYTES # BLD AUTO: 1.02 K/UL — SIGNIFICANT CHANGE UP (ref 1–3.3)
LYMPHOCYTES # BLD AUTO: 10.8 % — LOW (ref 13–44)
MAGNESIUM SERPL-MCNC: 2.1 MG/DL — SIGNIFICANT CHANGE UP (ref 1.6–2.6)
MCHC RBC-ENTMCNC: 33.5 GM/DL — SIGNIFICANT CHANGE UP (ref 32–36)
MCHC RBC-ENTMCNC: 34.4 PG — HIGH (ref 27–34)
MCV RBC AUTO: 102.6 FL — HIGH (ref 80–100)
MONOCYTES # BLD AUTO: 1.22 K/UL — HIGH (ref 0–0.9)
MONOCYTES NFR BLD AUTO: 13 % — SIGNIFICANT CHANGE UP (ref 2–14)
NEUTROPHILS # BLD AUTO: 6.9 K/UL — SIGNIFICANT CHANGE UP (ref 1.8–7.4)
NEUTROPHILS NFR BLD AUTO: 73.4 % — SIGNIFICANT CHANGE UP (ref 43–77)
NRBC # BLD: 0 /100 WBCS — SIGNIFICANT CHANGE UP (ref 0–0)
NT-PROBNP SERPL-SCNC: 1787 PG/ML — HIGH (ref 0–300)
PLATELET # BLD AUTO: 148 K/UL — LOW (ref 150–400)
POTASSIUM SERPL-MCNC: 3.9 MMOL/L — SIGNIFICANT CHANGE UP (ref 3.5–5.3)
POTASSIUM SERPL-MCNC: SIGNIFICANT CHANGE UP MMOL/L (ref 3.5–5.3)
POTASSIUM SERPL-SCNC: 3.9 MMOL/L — SIGNIFICANT CHANGE UP (ref 3.5–5.3)
POTASSIUM SERPL-SCNC: SIGNIFICANT CHANGE UP MMOL/L (ref 3.5–5.3)
RBC # BLD: 3.52 M/UL — LOW (ref 4.2–5.8)
RBC # FLD: 12 % — SIGNIFICANT CHANGE UP (ref 10.3–14.5)
SODIUM SERPL-SCNC: 132 MMOL/L — LOW (ref 135–145)
SODIUM SERPL-SCNC: 133 MMOL/L — LOW (ref 135–145)
WBC # BLD: 9.41 K/UL — SIGNIFICANT CHANGE UP (ref 3.8–10.5)
WBC # FLD AUTO: 9.41 K/UL — SIGNIFICANT CHANGE UP (ref 3.8–10.5)

## 2019-04-13 PROCEDURE — 93306 TTE W/DOPPLER COMPLETE: CPT | Mod: 26

## 2019-04-13 PROCEDURE — 93010 ELECTROCARDIOGRAM REPORT: CPT

## 2019-04-13 PROCEDURE — 99285 EMERGENCY DEPT VISIT HI MDM: CPT | Mod: 25

## 2019-04-13 PROCEDURE — 71045 X-RAY EXAM CHEST 1 VIEW: CPT | Mod: 26

## 2019-04-13 RX ORDER — FUROSEMIDE 40 MG
40 TABLET ORAL ONCE
Qty: 0 | Refills: 0 | Status: COMPLETED | OUTPATIENT
Start: 2019-04-13 | End: 2019-04-13

## 2019-04-13 RX ADMIN — Medication 40 MILLIGRAM(S): at 19:02

## 2019-04-13 NOTE — ED ADULT NURSE NOTE - CHPI ED NUR SYMPTOMS NEG
no body aches/no chest pain/no wheezing/no hemoptysis/no cough/no diaphoresis/no fever/no headache/no chills/no edema

## 2019-04-13 NOTE — ED CLERICAL - NS ED CLERK NOTE PRE-ARRIVAL INFORMATION; ADDITIONAL PRE-ARRIVAL INFORMATION
64 Y/O M ROBYN BAIRD BEING SENT IN BY DR VALENITN BENJAMIN FOR FLUID OVERLOAD HTN PLEASE CALL DR BENJAMIN @ [phone] AND CARDIO FELLOW

## 2019-04-13 NOTE — ED PROVIDER NOTE - CHIEF COMPLAINT
The patient is a 63y Male complaining of shortness of breath. The patient is a 63y Male complaining of edema

## 2019-04-13 NOTE — ED PROVIDER NOTE - PHYSICAL EXAMINATION
Constitutional: Well appearing, well nourished, awake, alert, oriented to person, place, time/situation and in no apparent distress.  ENMT: Airway patent. Normal MM  Eyes: Clear bilaterally  Cardiac: Normal rate, regular rhythm.  Heart sounds S1, S2.  No murmurs, rubs or gallops.  Respiratory: Breaths sounds equal diminished at b/l bases. No audible W/R/R. No increased WOB, hypoxia, or accessory mm use. Mild tachypnea. Pt speaks in full sentences.   Gastrointestinal: Abd soft, NT, ND, NABS. No guarding, rebound, or rigidity. No pulsatile abdominal masses. No organomegaly appreciated. No CVAT   Musculoskeletal: Range of motion is not limited. Trace edema.   Neuro: Alert and oriented x 3, face symmetric and speech fluent. Strength 5/5 x 4 ext and symmetric, nml gross motor movement, nml gait. No focal deficits noted.  Skin: Skin normal color for race, warm, dry and intact. No evidence of rash.  Psych: Alert and oriented to person, place, time/situation. normal mood and affect. no apparent risk to self or others.

## 2019-04-13 NOTE — ED ADULT NURSE NOTE - CHIEF COMPLAINT QUOTE
pt had cardiac ablation at [facility] 4/11 for afib, sent back to ED by MD for SOB onset today. Denies chest pain

## 2019-04-13 NOTE — ED PROVIDER NOTE - NS ED ROS FT
Constitutional: No fever or chills.   Eyes: No pain, blurry vision, or discharge.  ENMT: No hearing changes, pain, discharge or infections. No neck pain or stiffness.  Cardiac: See HPI  Respiratory: No cough or respiratory distress. No hemoptysis. No history of asthma or RAD.  GI: No nausea, vomiting, diarrhea or abdominal pain.  : No dysuria, frequency or burning.  MS: No myalgia, muscle weakness, joint pain or back pain.  Neuro: No headache or weakness. No LOC.  Skin: No skin rash.   Endocrine: No history of thyroid disease or diabetes.  Except as documented in the HPI, all other systems are negative.

## 2019-04-13 NOTE — ED PROVIDER NOTE - PROGRESS NOTE DETAILS
Pt seen and examined by cardio fellow Dr Frances, d/w Dr Yuan. Given IV lasix w/ improvement sx. Neg echo. Labs stable. Pt can be d/c;d home w/ Lasix 20 mg BID x 1 week. pt already has meds at home. Pt instructed by Dr Yuan to take 40 mg tomorrow morning, and then resume 20 mg BID. Pt feels better. Denies SOB. Pt ambulated in the ED and declines SOB and MABRY. Pt does not desat s/p walking. Return precautions given Pt seen and examined by cardio fellow Dr Frances, d/w Dr Denny. Given IV lasix w/ improvement sx. Neg echo, no pericardial effusion. Labs stable. Pt can be d/c;d home w/ Lasix 20 mg BID x 1 week. pt already has meds at home. Pt instructed by Dr Denny to take 40 mg tomorrow morning, and then resume 20 mg BID. Pt feels better. Denies SOB. Pt ambulated in the ED and declines SOB and MABRY. Pt does not desat s/p walking. Return precautions given

## 2019-04-13 NOTE — ED ADULT NURSE REASSESSMENT NOTE - NS ED NURSE REASSESS COMMENT FT1
Pt w/ 1L of urine output.  Pt elicits improvement of dyspnea and states "I feel 5 pounds lighter."  MD made aware.

## 2019-04-13 NOTE — ED ADULT NURSE NOTE - OBJECTIVE STATEMENT
Pt w/ PMH of HTN, HLD and AFib w/ recent ablation and d/c'd from [facility] CCU yesterday presents on referral of cardiology for SOB.  Pt is visibly tachypneic on exam.  Pt able to speak in complete sentences, but w/ heavy breathing between words.  Pt denies CP, HA, fever/chills, N/V/D or cough.  Pt is on CCM pending labs and eval by [facility].

## 2019-04-13 NOTE — ED ADULT TRIAGE NOTE - CHIEF COMPLAINT QUOTE
pt had cardiac ablation at [facility] 4/11 for afib, sent back to ED by MD for SOB onset today. Denies chest pain

## 2019-04-13 NOTE — ED PROVIDER NOTE - CLINICAL SUMMARY MEDICAL DECISION MAKING FREE TEXT BOX
Pt p/w edema, ? SOB s/p discharge yesterday s/p ablation for AF, complicated by hypotension and needing fluid resuscitation and pressors. Likely fluid overload 2/2 fluid resuscitation. Pt being evaluated by cardio Dr Frances on arrival to ED, in contact w/ Dr Denny in regards to tx plan. Check labs, EKG, bedside echo to be performed by Dr Frances. Trial of lasix. Dispo pending w/u, clinical status, cardiology recommendations

## 2019-04-13 NOTE — ED PROVIDER NOTE - OBJECTIVE STATEMENT
PT w/ PMHx HTN, HLD, Anxiety, FRANCISCO, Aflutter, and recent onset of AF admitted 4/11-12 for planned ablation, complicated postprocedural hypotension, requiring IVF and pressors. S/p discharge home yesterday afternoon, wife noted pt to have total body edema. Weight gain 20 lbs. Pt called Dr Denny this morning, was given Lasix 40 mg PO w/ little urine output, and referred into the ED. Wife notes pt was SOB, but pt denies. Pt denies orthopnea, PND, and CP. No prior hx CHF. No cough, f/c, nor hemoptysis.     Prior discharge summary: "PMH of HTN, HLD, Anxiety, FRANCISCO, Aflutter, and recent Afib admitted 4/11 for planned ablation. Procedure with no complications patient requiring phenylephrine infusion during procedure as per anesthesiologist. Patient post-procedure was stable asymptomatic. Patient arrived s/p ablation with Anesthesiologist/nurses at bedside, and found to have BP 70/40s seen on Arterial line (afebrile, HR 70s, saturating 100% on 2L NC). Patient AAOx3, warm well perfused throughout, denied any current symptoms of LTH, dizziness, palpitations, SOB, pain at the groin sites bilaterally, or abdominal pain. Pt did endorse he did not urinate since yesterday (with [place] cath in found to have >300cc urine). Patient given 1L NS bolus in pressurized bag and given bolus/started on phenylephrine infusion with improvement in BPs 100/60s. Etiology, patient likely hypovolemic in setting of decreased PO intake as well as effects from general anesthesia. Hb then downtrended from 16.1 to 11.1 concerning for RP bleed s/p CTa/p neg, likely dilutional. Hypotension resolved s/p phenylephrine infusion d/c. Echo illustrating small PFO. Patient remained with vital signs stable, discharged home with plan to follow up with EP."

## 2019-04-13 NOTE — ED PROVIDER NOTE - NSFOLLOWUPINSTRUCTIONS_ED_ALL_ED_FT
You were evaluated in the ED for edema. You have fluid overload, from the fluids received during your prior admission. You received IV Lasix (Furosemide) with good urine output and improvement in symptoms. You had an echo in the ED, which did not show any acute abnormalities. Your xray of the chest showed some fluid in the lungs. Take another dose of Lasix 40 mg in the morning tomorrow, and then continue 20 mg twice daily x 1 week. See your cardiologist on Monday for follow up appointment. Return for worsening edema, shortness of breath, chest pain, or other concerning symptoms.     Pulmonary Edema  Pulmonary edema is unusual buildup of fluid in the lungs. It makes it hard for a person to breathe. This condition is an emergency.    Follow these instructions at home:  Medicines     Take over-the-counter and prescription medicines only as told by your doctor.  If you were prescribed an antibiotic medicine, take it as told by your doctor. Do not stop taking the antibiotic even if you start to feel better.  Ask your doctor to help you write a plan with information about each medicine you take. This may include:    Why you are taking it.  Possible side effects.  Best time of day to take it.  Foods to take with it, or foods to avoid.  When to stop taking it.    Make a list of each medicine, vitamin, or herbal supplement you take.    Keep the list with you at all times.  Show the list to your doctor at each visit and before starting a new medicine.  Keep the list up to date.    Lifestyle     Do regular exercise as told by your doctor. It is important to do it safely. You can do this by:    Asking your doctor what exercises and activities are good and safe for you to do.  Pacing your activities. This will prevent shortness of breath or chest pain.  Resting for at least 1 hour before and after meals.  Asking about cardiac rehabilitation programs. These may include:    Education.  Exercise plans.  Counseling.      Eat a heart-healthy diet that is low in salt, saturated fat, and cholesterol. Your doctor may suggest foods that are high in fiber, such as:    Fresh fruits and vegetables.  Whole grains.  Beans.    ImageDo not use any products that contain nicotine or tobacco, such as cigarettes and e-cigarettes. If you need help quitting, ask your doctor.  General instructions     Keep a record of your weight.    Record your hospital or clinic weight. When you get home, compare it to your scale and record your weight.  Weigh yourself first thing in the morning each day, and record the weights. You should weigh yourself every morning after you pee and before you eat breakfast. Wear the same amount of clothing each time you weigh yourself.  Share your weight record with your doctor. These can help your doctor see if your body is holding extra fluid.  Tell your doctor right away if you have gained weight quickly, or if you have gained weight as told by your doctor. Your medicines may need to be adjusted.    Check your blood pressure as often as told by your doctor.    [facility] a [facility]  Record your blood pressure readings. Bring them with you for your clinic visits.    Stay at a healthy weight. Ask your doctor what weight is healthy for you.  Think about doing therapy or being a part of a support group.  Keep all follow-up visits as told by your doctor. This is important.  Contact a doctor if:  You have questions about your medicines.  You miss a dose of your medicine.  Get help right away if:  You have very bad chest pain, especially if the pain is crushing or pressure-like and spreads to the arms, back, neck, or jaw.  You have more swelling in your hands, feet, ankles, or belly (abdomen).  You feel sick to your stomach (nauseous).  You have strange sweating.  Your skin turns blue or pale.  Your shortness of breath gets worse.  You feel dizzy or unsteady.  Your vision is blurry.  You have a headache.  You cough up bloody split.  You cannot sleep because it is hard to breathe.  You start to feel a “jumping” or “fluttering” sensation (palpitations) in the chest that is unusual for you.  You feel like you cannot get enough air.  You gain weight rapidly.  These symptoms may be an emergency. Do not wait to see if the symptoms will go away. Get medical help right away. Call your local emergency services (911 in the [place] Do not drive yourself to the hospital.     Summary  Pulmonary edema is unusual fluid buildup in the lungs that can make it hard to breathe.  This condition is an emergency.  Keep a record of your weight. Call your doctor if you gain weight rapidly.  This information is not intended to replace advice given to you by your health care provider. Make sure you discuss any questions you have with your health care provider.

## 2019-04-15 PROCEDURE — 83880 ASSAY OF NATRIURETIC PEPTIDE: CPT

## 2019-04-15 PROCEDURE — 83735 ASSAY OF MAGNESIUM: CPT

## 2019-04-15 PROCEDURE — 80048 BASIC METABOLIC PNL TOTAL CA: CPT

## 2019-04-15 PROCEDURE — 85730 THROMBOPLASTIN TIME PARTIAL: CPT

## 2019-04-15 PROCEDURE — 93306 TTE W/DOPPLER COMPLETE: CPT

## 2019-04-15 PROCEDURE — 36415 COLL VENOUS BLD VENIPUNCTURE: CPT

## 2019-04-15 PROCEDURE — 71045 X-RAY EXAM CHEST 1 VIEW: CPT

## 2019-04-15 PROCEDURE — 85610 PROTHROMBIN TIME: CPT

## 2019-04-15 PROCEDURE — 85025 COMPLETE CBC W/AUTO DIFF WBC: CPT

## 2019-04-15 PROCEDURE — 99284 EMERGENCY DEPT VISIT MOD MDM: CPT | Mod: 25

## 2019-04-15 PROCEDURE — 96374 THER/PROPH/DIAG INJ IV PUSH: CPT

## 2019-04-15 PROCEDURE — 93005 ELECTROCARDIOGRAM TRACING: CPT

## 2019-04-17 DIAGNOSIS — E87.70 FLUID OVERLOAD, UNSPECIFIED: ICD-10-CM

## 2019-04-17 DIAGNOSIS — R06.02 SHORTNESS OF BREATH: ICD-10-CM

## 2019-04-17 DIAGNOSIS — Z79.899 OTHER LONG TERM (CURRENT) DRUG THERAPY: ICD-10-CM

## 2019-04-17 DIAGNOSIS — E78.5 HYPERLIPIDEMIA, UNSPECIFIED: ICD-10-CM

## 2019-04-17 DIAGNOSIS — I10 ESSENTIAL (PRIMARY) HYPERTENSION: ICD-10-CM

## 2019-04-18 ENCOUNTER — APPOINTMENT (OUTPATIENT)
Dept: HEART AND VASCULAR | Facility: CLINIC | Age: 64
End: 2019-04-18
Payer: COMMERCIAL

## 2019-04-18 ENCOUNTER — OUTPATIENT (OUTPATIENT)
Dept: OUTPATIENT SERVICES | Facility: HOSPITAL | Age: 64
LOS: 1 days | End: 2019-04-18
Payer: COMMERCIAL

## 2019-04-18 ENCOUNTER — NON-APPOINTMENT (OUTPATIENT)
Age: 64
End: 2019-04-18

## 2019-04-18 VITALS
DIASTOLIC BLOOD PRESSURE: 90 MMHG | HEIGHT: 68 IN | SYSTOLIC BLOOD PRESSURE: 132 MMHG | WEIGHT: 172 LBS | BODY MASS INDEX: 26.07 KG/M2 | HEART RATE: 78 BPM

## 2019-04-18 DIAGNOSIS — Z98.890 OTHER SPECIFIED POSTPROCEDURAL STATES: Chronic | ICD-10-CM

## 2019-04-18 PROCEDURE — 93000 ELECTROCARDIOGRAM COMPLETE: CPT

## 2019-04-18 PROCEDURE — 99214 OFFICE O/P EST MOD 30 MIN: CPT | Mod: 25

## 2019-04-18 PROCEDURE — 71046 X-RAY EXAM CHEST 2 VIEWS: CPT | Mod: 26

## 2019-04-18 PROCEDURE — 71046 X-RAY EXAM CHEST 2 VIEWS: CPT

## 2019-04-18 RX ORDER — FUROSEMIDE 20 MG/1
20 TABLET ORAL
Qty: 20 | Refills: 0 | Status: DISCONTINUED | COMMUNITY
Start: 2019-04-13 | End: 2019-04-18

## 2019-04-19 DIAGNOSIS — I48.91 UNSPECIFIED ATRIAL FIBRILLATION: ICD-10-CM

## 2019-04-19 DIAGNOSIS — I95.81 POSTPROCEDURAL HYPOTENSION: ICD-10-CM

## 2019-04-19 DIAGNOSIS — Q21.1 ATRIAL SEPTAL DEFECT: ICD-10-CM

## 2019-04-19 DIAGNOSIS — I48.92 UNSPECIFIED ATRIAL FLUTTER: ICD-10-CM

## 2019-04-19 DIAGNOSIS — G47.33 OBSTRUCTIVE SLEEP APNEA (ADULT) (PEDIATRIC): ICD-10-CM

## 2019-04-19 DIAGNOSIS — I10 ESSENTIAL (PRIMARY) HYPERTENSION: ICD-10-CM

## 2019-04-19 DIAGNOSIS — E86.1 HYPOVOLEMIA: ICD-10-CM

## 2019-04-19 DIAGNOSIS — I45.10 UNSPECIFIED RIGHT BUNDLE-BRANCH BLOCK: ICD-10-CM

## 2019-04-19 DIAGNOSIS — E78.5 HYPERLIPIDEMIA, UNSPECIFIED: ICD-10-CM

## 2019-04-19 DIAGNOSIS — F41.9 ANXIETY DISORDER, UNSPECIFIED: ICD-10-CM

## 2019-04-25 PROCEDURE — 94660 CPAP INITIATION&MGMT: CPT

## 2019-04-25 PROCEDURE — C1894: CPT

## 2019-04-25 PROCEDURE — 93005 ELECTROCARDIOGRAM TRACING: CPT

## 2019-04-25 PROCEDURE — 85610 PROTHROMBIN TIME: CPT

## 2019-04-25 PROCEDURE — 86900 BLOOD TYPING SEROLOGIC ABO: CPT

## 2019-04-25 PROCEDURE — 36415 COLL VENOUS BLD VENIPUNCTURE: CPT

## 2019-04-25 PROCEDURE — 86850 RBC ANTIBODY SCREEN: CPT

## 2019-04-25 PROCEDURE — C1766: CPT

## 2019-04-25 PROCEDURE — C2630: CPT

## 2019-04-25 PROCEDURE — C1732: CPT

## 2019-04-25 PROCEDURE — 93306 TTE W/DOPPLER COMPLETE: CPT

## 2019-04-25 PROCEDURE — 86901 BLOOD TYPING SEROLOGIC RH(D): CPT

## 2019-04-25 PROCEDURE — 85730 THROMBOPLASTIN TIME PARTIAL: CPT

## 2019-04-25 PROCEDURE — 83735 ASSAY OF MAGNESIUM: CPT

## 2019-04-25 PROCEDURE — 83605 ASSAY OF LACTIC ACID: CPT

## 2019-04-25 PROCEDURE — 85027 COMPLETE CBC AUTOMATED: CPT

## 2019-04-25 PROCEDURE — 80048 BASIC METABOLIC PNL TOTAL CA: CPT

## 2019-04-25 PROCEDURE — C1730: CPT

## 2019-04-25 PROCEDURE — 74176 CT ABD & PELVIS W/O CONTRAST: CPT

## 2019-04-25 PROCEDURE — C1759: CPT

## 2019-04-25 PROCEDURE — 86803 HEPATITIS C AB TEST: CPT

## 2019-05-02 NOTE — REVIEW OF SYSTEMS
[Negative] : Heme/Lymph [see HPI] : see HPI [Fever] : no fever [Chills] : no chills [Feeling Fatigued] : not feeling fatigued

## 2019-05-02 NOTE — DISCUSSION/SUMMARY
[FreeTextEntry1] : Mr. Oliveira is a pleasant 63 year old male with HTN, HLD and atrial flutter s/p ablation for typical flutter on 1/23/2018 and ablation of atypical atrial flutter 4/2019 c/b hypotension, who presents for follow up.  He is doing well and is now euvolemic.  He will stop lasix.  He is in normal sinus rhythm today and will continue oral anticoagulation.  We discussed that he had some scar in his atria and while the ablation went well he is at risk for developing another arrhythmia in the future.  He is in agreement with continuing oral anticoagulation.  He sees his doctor every 3-4 months and will get an EKG.  If he notes any change in the way he feels he will come in for an EKG.  He will follow up with me in 3 months or sooner if needed and he knows to call with any questions or concerns.

## 2019-05-02 NOTE — PHYSICAL EXAM
[General Appearance - Well Developed] : well developed [Normal Appearance] : normal appearance [Well Groomed] : well groomed [General Appearance - Well Nourished] : well nourished [No Deformities] : no deformities [General Appearance - In No Acute Distress] : no acute distress [Normal Conjunctiva] : the conjunctiva exhibited no abnormalities [Normal Oral Mucosa] : normal oral mucosa [Normal Jugular Venous V Waves Present] : normal jugular venous V waves present [Respiration, Rhythm And Depth] : normal respiratory rhythm and effort [Exaggerated Use Of Accessory Muscles For Inspiration] : no accessory muscle use [Auscultation Breath Sounds / Voice Sounds] : lungs were clear to auscultation bilaterally [Heart Sounds] : normal S1 and S2 [Heart Rate And Rhythm] : heart rate and rhythm were normal [Abnormal Walk] : normal gait [Gait - Sufficient For Exercise Testing] : the gait was sufficient for exercise testing [Cyanosis, Localized] : no localized cyanosis [Oriented To Time, Place, And Person] : oriented to person, place, and time [Impaired Insight] : insight and judgment were intact [Affect] : the affect was normal [Mood] : the mood was normal [No Anxiety] : not feeling anxious [Normal] : normal [No Oral Cyanosis] : no oral cyanosis [Murmurs] : no murmurs present [Edema] : no peripheral edema present [] : no ischemic changes [Skin Color & Pigmentation] : normal skin color and pigmentation [5th Left ICS - MCL] : palpated at the 5th LICS in the midclavicular line [No Precordial Heave] : no precordial heave was noted [Normal Rate] : normal [Rhythm Regular] : regular [Normal S1] : normal S1 [Normal S2] : normal S2 [Click] : no click [Apical Thrill] : no thrill palpable at the apex [Pericardial Rub] : no pericardial rub

## 2019-05-02 NOTE — HISTORY OF PRESENT ILLNESS
[FreeTextEntry1] : Mr. Oliveira is a pleasant 63 year old male with HTN, HLD and atrial flutter s/p ablation for typical flutter on 1/23/2018 and ablation of atypical atrial flutter 4/2019 c/b hypotension, who presents for follow up.\par \par He was recently seen for routine follow up and was in atypical atrial flutter.  He ultimately underwent a successful ablation.  Post procedure he had a prolonged episode of hypotension; likely secondary to the anesthesia.  He was hydrated and states he gained 20 pounds.  He was discharged on lasix and after came to the ER with edema and was given IV lasix.  Since then he has noted an improvement in the way he feels since then.  He states he is now down to one pound below his weight before the ablation.  He notes more energy and denies any palpitations, syncope, near syncope, orthopnea, PND, peripheral edema.  \par He states that his energy has significantly increased post ablation, however he was recently diagnosed with flutter during routine check. He was started on anticoagulation 3/11. Symptoms include very mild fatigue. \par

## 2019-05-13 ENCOUNTER — RX RENEWAL (OUTPATIENT)
Age: 64
End: 2019-05-13

## 2019-06-18 ENCOUNTER — APPOINTMENT (OUTPATIENT)
Dept: CARDIOLOGY | Facility: CLINIC | Age: 64
End: 2019-06-18
Payer: COMMERCIAL

## 2019-06-18 VITALS
HEART RATE: 70 BPM | BODY MASS INDEX: 26.37 KG/M2 | SYSTOLIC BLOOD PRESSURE: 130 MMHG | HEIGHT: 68 IN | DIASTOLIC BLOOD PRESSURE: 100 MMHG | WEIGHT: 174 LBS

## 2019-06-18 PROCEDURE — 93000 ELECTROCARDIOGRAM COMPLETE: CPT

## 2019-06-18 PROCEDURE — 99213 OFFICE O/P EST LOW 20 MIN: CPT

## 2019-06-18 NOTE — REVIEW OF SYSTEMS
Problem: Potential for impaired gas exchange  Goal: Patient will not exhibit signs/symptoms of respiratory distress  Outcome: PROGRESSING SLOWER THAN EXPECTED  Infant is extremely spitty - multiple episodes of spitting up throughout shift. Educated parents about use of bulb syringe and to lay infant on his side during these episodes.     Problem: Potential for infection related to maternal infection  Goal: Patient will be free of signs/symptoms of infection  Outcome: PROGRESSING AS EXPECTED  Infant afebrile throughout shift.        [Negative] : Heme/Lymph

## 2019-06-18 NOTE — DISCUSSION/SUMMARY
[FreeTextEntry1] : The patient's cardiac status is stable. Today's EKG reveals normal sinus rhythm. The examination is stable the blood pressure readings were slightly elevated today. I would recommend that the current medications be continued the blood pressure will be checked by his primary care physician. In keeping with the original recommendation if the patient stays in sinus rhythm for another several weeks we will then be able to switch from a eliquis to aspirin.

## 2019-06-18 NOTE — PHYSICAL EXAM
[General Appearance - Well Developed] : well developed [Normal Appearance] : normal appearance [No Deformities] : no deformities [General Appearance - Well Nourished] : well nourished [Well Groomed] : well groomed [General Appearance - In No Acute Distress] : no acute distress [Normal Conjunctiva] : the conjunctiva exhibited no abnormalities [Eyelids - No Xanthelasma] : the eyelids demonstrated no xanthelasmas [Normal Oral Mucosa] : normal oral mucosa [No Oral Cyanosis] : no oral cyanosis [No Oral Pallor] : no oral pallor [Normal Jugular Venous A Waves Present] : normal jugular venous A waves present [Normal Jugular Venous V Waves Present] : normal jugular venous V waves present [No Jugular Venous Amezquita A Waves] : no jugular venous amezquita A waves [Auscultation Breath Sounds / Voice Sounds] : lungs were clear to auscultation bilaterally [Exaggerated Use Of Accessory Muscles For Inspiration] : no accessory muscle use [Respiration, Rhythm And Depth] : normal respiratory rhythm and effort [Murmurs] : no murmurs present [Heart Rate And Rhythm] : heart rate and rhythm were normal [Heart Sounds] : normal S1 and S2 [Abdomen Tenderness] : non-tender [Abdomen Soft] : soft [Gait - Sufficient For Exercise Testing] : the gait was sufficient for exercise testing [Abdomen Mass (___ Cm)] : no abdominal mass palpated [Abnormal Walk] : normal gait [Petechial Hemorrhages (___cm)] : no petechial hemorrhages [Cyanosis, Localized] : no localized cyanosis [Nail Clubbing] : no clubbing of the fingernails [Skin Color & Pigmentation] : normal skin color and pigmentation [] : no ischemic changes [No Venous Stasis] : no venous stasis [Skin Lesions] : no skin lesions [No Skin Ulcers] : no skin ulcer [No Xanthoma] : no  xanthoma was observed [Oriented To Time, Place, And Person] : oriented to person, place, and time [Mood] : the mood was normal [No Anxiety] : not feeling anxious [Affect] : the affect was normal

## 2019-06-18 NOTE — REASON FOR VISIT
[FreeTextEntry1] : The patient is doing very well following an ablation procedure for atrial flutter done several months ago. Initially there was a complication of fluid overload which has now completely resolved the patient is back to his regular weight. There have been no acute cardiac symptoms and the patient seems to be holding in sinus rhythm.

## 2019-06-19 ENCOUNTER — NON-APPOINTMENT (OUTPATIENT)
Age: 64
End: 2019-06-19

## 2019-08-05 ENCOUNTER — APPOINTMENT (OUTPATIENT)
Dept: HEART AND VASCULAR | Facility: CLINIC | Age: 64
End: 2019-08-05
Payer: COMMERCIAL

## 2019-08-05 VITALS
SYSTOLIC BLOOD PRESSURE: 128 MMHG | HEART RATE: 80 BPM | BODY MASS INDEX: 26.22 KG/M2 | DIASTOLIC BLOOD PRESSURE: 90 MMHG | HEIGHT: 68 IN | WEIGHT: 173 LBS

## 2019-08-05 PROCEDURE — 99213 OFFICE O/P EST LOW 20 MIN: CPT

## 2019-08-05 PROCEDURE — 93000 ELECTROCARDIOGRAM COMPLETE: CPT

## 2019-08-05 RX ORDER — FLUOXETINE HYDROCHLORIDE 40 MG/1
40 CAPSULE ORAL
Refills: 0 | Status: ACTIVE | COMMUNITY
Start: 2017-08-19

## 2019-08-05 RX ORDER — LOSARTAN POTASSIUM 50 MG/1
50 TABLET, FILM COATED ORAL
Refills: 0 | Status: ACTIVE | COMMUNITY
Start: 2017-05-11

## 2019-08-05 RX ORDER — DULOXETINE HYDROCHLORIDE 60 MG/1
60 CAPSULE, DELAYED RELEASE PELLETS ORAL
Refills: 0 | Status: ACTIVE | COMMUNITY
Start: 2017-09-06

## 2019-08-05 RX ORDER — ATORVASTATIN CALCIUM 10 MG/1
10 TABLET, FILM COATED ORAL
Refills: 0 | Status: ACTIVE | COMMUNITY
Start: 2017-05-17

## 2019-08-05 RX ORDER — DULOXETINE HYDROCHLORIDE 30 MG/1
30 CAPSULE, DELAYED RELEASE PELLETS ORAL
Refills: 0 | Status: ACTIVE | COMMUNITY
Start: 2017-12-13

## 2019-08-05 NOTE — PHYSICAL EXAM
[General Appearance - Well Developed] : well developed [Normal Appearance] : normal appearance [General Appearance - Well Nourished] : well nourished [Well Groomed] : well groomed [No Deformities] : no deformities [General Appearance - In No Acute Distress] : no acute distress [Normal Conjunctiva] : the conjunctiva exhibited no abnormalities [Normal Oral Mucosa] : normal oral mucosa [No Oral Cyanosis] : no oral cyanosis [Normal Jugular Venous V Waves Present] : normal jugular venous V waves present [Respiration, Rhythm And Depth] : normal respiratory rhythm and effort [Exaggerated Use Of Accessory Muscles For Inspiration] : no accessory muscle use [Auscultation Breath Sounds / Voice Sounds] : lungs were clear to auscultation bilaterally [Heart Rate And Rhythm] : heart rate and rhythm were normal [Heart Sounds] : normal S1 and S2 [Murmurs] : no murmurs present [Abnormal Walk] : normal gait [Edema] : no peripheral edema present [Gait - Sufficient For Exercise Testing] : the gait was sufficient for exercise testing [Cyanosis, Localized] : no localized cyanosis [Skin Color & Pigmentation] : normal skin color and pigmentation [] : no rash [Impaired Insight] : insight and judgment were intact [Oriented To Time, Place, And Person] : oriented to person, place, and time [Affect] : the affect was normal [Mood] : the mood was normal [No Anxiety] : not feeling anxious [5th Left ICS - MCL] : palpated at the 5th LICS in the midclavicular line [Normal] : normal [No Precordial Heave] : no precordial heave was noted [Apical Thrill] : no thrill palpable at the apex [Normal Rate] : normal [Rhythm Regular] : regular [Normal S1] : normal S1 [Normal S2] : normal S2 [Click] : no click [Pericardial Rub] : no pericardial rub

## 2019-08-05 NOTE — REVIEW OF SYSTEMS
[Fever] : no fever [Chills] : no chills [Feeling Fatigued] : not feeling fatigued [see HPI] : see HPI [Negative] : Heme/Lymph

## 2019-08-05 NOTE — DISCUSSION/SUMMARY
[FreeTextEntry1] : Doing well post atypical flutter ablation, no signs of arrhtyhmia, low CHADVASC score. \par I recommended a week monitoring for silent arrhtyhmias. He is >3 months post ablation and has low thromboembolic risk. WIll discontinue anticoagulation. Will communicate after the event recorder, follow up in 4 months.

## 2019-08-05 NOTE — HISTORY OF PRESENT ILLNESS
[FreeTextEntry1] : Mr. Oliveira is a pleasant 63 year old male with HTN, HLD and atrial flutter s/p ablation for typical flutter on 1/23/2018 and ablation of atypical atrial flutter 4/2019 c/b hypotension, who presents for follow up.\par He is doing well with no SOB, CP, palpitation, energy level is great. No problems with swallowing. He is interested in coming off anticoagulation.

## 2019-08-06 ENCOUNTER — RX RENEWAL (OUTPATIENT)
Age: 64
End: 2019-08-06

## 2019-11-26 ENCOUNTER — APPOINTMENT (OUTPATIENT)
Dept: CARDIOLOGY | Facility: CLINIC | Age: 64
End: 2019-11-26
Payer: COMMERCIAL

## 2019-11-26 VITALS
SYSTOLIC BLOOD PRESSURE: 125 MMHG | DIASTOLIC BLOOD PRESSURE: 88 MMHG | HEART RATE: 68 BPM | BODY MASS INDEX: 25.76 KG/M2 | WEIGHT: 170 LBS | HEIGHT: 68 IN

## 2019-11-26 PROCEDURE — 99212 OFFICE O/P EST SF 10 MIN: CPT | Mod: 25

## 2019-11-26 PROCEDURE — 93015 CV STRESS TEST SUPVJ I&R: CPT

## 2019-11-26 RX ORDER — APIXABAN 5 MG/1
5 TABLET, FILM COATED ORAL
Qty: 180 | Refills: 0 | Status: DISCONTINUED | COMMUNITY
Start: 2017-12-27 | End: 2019-11-26

## 2019-11-26 NOTE — PHYSICAL EXAM
[General Appearance - Well Developed] : well developed [Normal Appearance] : normal appearance [General Appearance - Well Nourished] : well nourished [Well Groomed] : well groomed [No Deformities] : no deformities [General Appearance - In No Acute Distress] : no acute distress [Eyelids - No Xanthelasma] : the eyelids demonstrated no xanthelasmas [Normal Conjunctiva] : the conjunctiva exhibited no abnormalities [Normal Oral Mucosa] : normal oral mucosa [No Oral Pallor] : no oral pallor [No Oral Cyanosis] : no oral cyanosis [Normal Jugular Venous V Waves Present] : normal jugular venous V waves present [Normal Jugular Venous A Waves Present] : normal jugular venous A waves present [No Jugular Venous Amezquita A Waves] : no jugular venous amezquita A waves [Exaggerated Use Of Accessory Muscles For Inspiration] : no accessory muscle use [Respiration, Rhythm And Depth] : normal respiratory rhythm and effort [Auscultation Breath Sounds / Voice Sounds] : lungs were clear to auscultation bilaterally [Murmurs] : no murmurs present [Heart Sounds] : normal S1 and S2 [Heart Rate And Rhythm] : heart rate and rhythm were normal [Abdomen Soft] : soft [Abdomen Tenderness] : non-tender [Abdomen Mass (___ Cm)] : no abdominal mass palpated [Abnormal Walk] : normal gait [Gait - Sufficient For Exercise Testing] : the gait was sufficient for exercise testing [Nail Clubbing] : no clubbing of the fingernails [Cyanosis, Localized] : no localized cyanosis [Petechial Hemorrhages (___cm)] : no petechial hemorrhages [Skin Color & Pigmentation] : normal skin color and pigmentation [No Venous Stasis] : no venous stasis [] : no rash [Skin Lesions] : no skin lesions [No Xanthoma] : no  xanthoma was observed [No Skin Ulcers] : no skin ulcer [Oriented To Time, Place, And Person] : oriented to person, place, and time [Affect] : the affect was normal [Mood] : the mood was normal [No Anxiety] : not feeling anxious

## 2019-11-26 NOTE — DISCUSSION/SUMMARY
[FreeTextEntry1] : The patient is doing extremely well following successful ablation in January of this year. Fortunately there have been no recurrences of any arrhythmias and the patient has resumed his program of regular exercise etc. He did very well and today stress test with no evidence of arrhythmia or exercise-induced ischemia the patient was able to easily exercise to linda stage IV. Based on today's evaluation and very pleased with William rosenberg with the current medications will be continued. Followup on an annual basis.

## 2020-06-11 ENCOUNTER — APPOINTMENT (OUTPATIENT)
Dept: GASTROENTEROLOGY | Facility: CLINIC | Age: 65
End: 2020-06-11
Payer: COMMERCIAL

## 2020-06-11 VITALS
SYSTOLIC BLOOD PRESSURE: 120 MMHG | HEART RATE: 80 BPM | WEIGHT: 170 LBS | TEMPERATURE: 97.8 F | BODY MASS INDEX: 25.76 KG/M2 | DIASTOLIC BLOOD PRESSURE: 80 MMHG | OXYGEN SATURATION: 94 % | HEIGHT: 68 IN

## 2020-06-11 DIAGNOSIS — Z86.010 PERSONAL HISTORY OF COLONIC POLYPS: ICD-10-CM

## 2020-06-11 DIAGNOSIS — R13.10 DYSPHAGIA, UNSPECIFIED: ICD-10-CM

## 2020-06-11 PROCEDURE — 99214 OFFICE O/P EST MOD 30 MIN: CPT

## 2020-06-11 RX ORDER — DIAZEPAM 10 MG/1
10 TABLET ORAL
Refills: 0 | Status: DISCONTINUED | COMMUNITY
Start: 2017-09-06 | End: 2020-06-11

## 2020-06-11 NOTE — HISTORY OF PRESENT ILLNESS
[de-identified] : Presents with 6-12 months heartburn. Additionally with 2-3 months of a globus sensation. Denies nausea, vomiting, fever, chills, diarrhea, constipation. Last seen in 6/2018 for presumed steatohepatitis secondary to ETOH ( 4-5 glasses on wine daily)  +/- cholesterol. Labs in 5/2018 were normal. Sonogram in 3/2018 were c/w fatty liver. Viral  / autoimmune markers / metabolic  markers of liver disease were normal in  3/2018.  Last colonoscopy in 12/2015 was notable for hemorrhoids and diverticulosis ( indication was family h/o colon cancer and personal h/o colon polyps) . Patient has had numerous colonoscopies prior to 2015...some of which revealed polyps

## 2020-06-11 NOTE — ASSESSMENT
[FreeTextEntry1] : 1. GERD / dysphagia:  EGD scheduled\par \par 2. History of colon polyp / family history of colon cancer:  Colonoscopy to be scheduled

## 2020-06-11 NOTE — PHYSICAL EXAM
[General Appearance - Alert] : alert [Sclera] : the sclera and conjunctiva were normal [General Appearance - In No Acute Distress] : in no acute distress [Outer Ear] : the ears and nose were normal in appearance [Neck Appearance] : the appearance of the neck was normal [Abnormal Walk] : normal gait [FreeTextEntry1] : deferred [Abdomen Soft] : soft [No Focal Deficits] : no focal deficits [Skin Color & Pigmentation] : normal skin color and pigmentation [Oriented To Time, Place, And Person] : oriented to person, place, and time

## 2020-06-13 ENCOUNTER — RESULT REVIEW (OUTPATIENT)
Age: 65
End: 2020-06-13

## 2020-06-15 ENCOUNTER — RESULT REVIEW (OUTPATIENT)
Age: 65
End: 2020-06-15

## 2020-06-16 ENCOUNTER — APPOINTMENT (OUTPATIENT)
Dept: GASTROENTEROLOGY | Facility: HOSPITAL | Age: 65
End: 2020-06-16

## 2020-07-20 ENCOUNTER — APPOINTMENT (OUTPATIENT)
Dept: CARDIOLOGY | Facility: CLINIC | Age: 65
End: 2020-07-20
Payer: MEDICARE

## 2020-07-20 ENCOUNTER — NON-APPOINTMENT (OUTPATIENT)
Age: 65
End: 2020-07-20

## 2020-07-20 VITALS
WEIGHT: 170 LBS | DIASTOLIC BLOOD PRESSURE: 80 MMHG | SYSTOLIC BLOOD PRESSURE: 118 MMHG | HEIGHT: 68 IN | BODY MASS INDEX: 25.76 KG/M2 | HEART RATE: 75 BPM

## 2020-07-20 DIAGNOSIS — K21.9 GASTRO-ESOPHAGEAL REFLUX DISEASE W/OUT ESOPHAGITIS: ICD-10-CM

## 2020-07-20 PROCEDURE — 99213 OFFICE O/P EST LOW 20 MIN: CPT

## 2020-07-20 PROCEDURE — 93000 ELECTROCARDIOGRAM COMPLETE: CPT

## 2020-07-20 NOTE — PHYSICAL EXAM
[General Appearance - Well Developed] : well developed [General Appearance - Well Nourished] : well nourished [Well Groomed] : well groomed [Normal Appearance] : normal appearance [Normal Conjunctiva] : the conjunctiva exhibited no abnormalities [No Deformities] : no deformities [General Appearance - In No Acute Distress] : no acute distress [Eyelids - No Xanthelasma] : the eyelids demonstrated no xanthelasmas [No Oral Pallor] : no oral pallor [Normal Oral Mucosa] : normal oral mucosa [Normal Jugular Venous A Waves Present] : normal jugular venous A waves present [No Oral Cyanosis] : no oral cyanosis [No Jugular Venous Amezquita A Waves] : no jugular venous amezquita A waves [Normal Jugular Venous V Waves Present] : normal jugular venous V waves present [Heart Rate And Rhythm] : heart rate and rhythm were normal [Abdomen Soft] : soft [Murmurs] : no murmurs present [Heart Sounds] : normal S1 and S2 [Abdomen Mass (___ Cm)] : no abdominal mass palpated [Abdomen Tenderness] : non-tender [Gait - Sufficient For Exercise Testing] : the gait was sufficient for exercise testing [Abnormal Walk] : normal gait [Petechial Hemorrhages (___cm)] : no petechial hemorrhages [Cyanosis, Localized] : no localized cyanosis [Nail Clubbing] : no clubbing of the fingernails [Skin Color & Pigmentation] : normal skin color and pigmentation [No Venous Stasis] : no venous stasis [] : no rash [Skin Lesions] : no skin lesions [No Skin Ulcers] : no skin ulcer [No Xanthoma] : no  xanthoma was observed [Oriented To Time, Place, And Person] : oriented to person, place, and time [Affect] : the affect was normal [Mood] : the mood was normal [No Anxiety] : not feeling anxious

## 2020-07-20 NOTE — DISCUSSION/SUMMARY
[FreeTextEntry1] : The patient's cardiac status remained completely stable. There have been no clinical recurrences of atrial flutter or other arrhythmias following his successful ablation last year. The patient has had no cardiac symptoms. There have been no symptoms of chest pain shortness of breath or palpitations. His examination is satisfactory. His electrocardiogram reveals incomplete right bundle branch block and left axis deviation. Based on today's examination I found the patient's cardiovascular status to be stable. No change in medications will be undertaken at this time. Followup on an annual basis.

## 2020-07-20 NOTE — REASON FOR VISIT
[FreeTextEntry1] : The patient was followed post successful ablation for atrial flutter performed in January 2019. There have been no known recurrences of the arrhythmia following that procedure.

## 2020-08-15 ENCOUNTER — RESULT REVIEW (OUTPATIENT)
Age: 65
End: 2020-08-15

## 2020-08-17 ENCOUNTER — RESULT REVIEW (OUTPATIENT)
Age: 65
End: 2020-08-17

## 2020-08-18 ENCOUNTER — APPOINTMENT (OUTPATIENT)
Dept: GASTROENTEROLOGY | Facility: HOSPITAL | Age: 65
End: 2020-08-18

## 2020-09-10 ENCOUNTER — RX RENEWAL (OUTPATIENT)
Age: 65
End: 2020-09-10

## 2020-10-19 ENCOUNTER — RX RENEWAL (OUTPATIENT)
Age: 65
End: 2020-10-19

## 2021-01-15 ENCOUNTER — RX RENEWAL (OUTPATIENT)
Age: 66
End: 2021-01-15

## 2021-01-15 RX ORDER — ATENOLOL 25 MG/1
25 TABLET ORAL
Qty: 90 | Refills: 3 | Status: ACTIVE | COMMUNITY
Start: 2018-01-10 | End: 1900-01-01

## 2021-02-23 ENCOUNTER — APPOINTMENT (OUTPATIENT)
Dept: CARDIOLOGY | Facility: CLINIC | Age: 66
End: 2021-02-23
Payer: MEDICARE

## 2021-02-23 VITALS
BODY MASS INDEX: 25.61 KG/M2 | HEIGHT: 68 IN | DIASTOLIC BLOOD PRESSURE: 80 MMHG | TEMPERATURE: 97.5 F | WEIGHT: 169 LBS | SYSTOLIC BLOOD PRESSURE: 115 MMHG | HEART RATE: 69 BPM

## 2021-02-23 PROCEDURE — 93015 CV STRESS TEST SUPVJ I&R: CPT

## 2021-02-23 PROCEDURE — 99072 ADDL SUPL MATRL&STAF TM PHE: CPT

## 2021-02-23 PROCEDURE — 99213 OFFICE O/P EST LOW 20 MIN: CPT | Mod: 25

## 2021-02-23 RX ORDER — ASPIRIN 81 MG/1
81 TABLET, DELAYED RELEASE ORAL DAILY
Refills: 0 | Status: ACTIVE | COMMUNITY

## 2021-02-23 NOTE — DISCUSSION/SUMMARY
[FreeTextEntry1] : Patient is doing extremely well. There have been no cardiac symptoms. The treadmill stress test today revealed no evidence of exercise-induced myocardial ischemia or arrhythmias and excellent exercise capacity. Based on today's visit I find the patient's cardiovascular status to be stable. Current medications will be continued followup on an annual basis.

## 2021-02-23 NOTE — PHYSICAL EXAM
[General Appearance - Well Developed] : well developed [Normal Appearance] : normal appearance [Well Groomed] : well groomed [General Appearance - Well Nourished] : well nourished [No Deformities] : no deformities [General Appearance - In No Acute Distress] : no acute distress [Normal Conjunctiva] : the conjunctiva exhibited no abnormalities [Eyelids - No Xanthelasma] : the eyelids demonstrated no xanthelasmas [Normal Oral Mucosa] : normal oral mucosa [No Oral Pallor] : no oral pallor [No Oral Cyanosis] : no oral cyanosis [Normal Jugular Venous A Waves Present] : normal jugular venous A waves present [Normal Jugular Venous V Waves Present] : normal jugular venous V waves present [No Jugular Venous Amezquita A Waves] : no jugular venous amezquita A waves [Respiration, Rhythm And Depth] : normal respiratory rhythm and effort [Exaggerated Use Of Accessory Muscles For Inspiration] : no accessory muscle use [Auscultation Breath Sounds / Voice Sounds] : lungs were clear to auscultation bilaterally [Heart Rate And Rhythm] : heart rate and rhythm were normal [Heart Sounds] : normal S1 and S2 [Murmurs] : no murmurs present [Abdomen Soft] : soft [Abdomen Tenderness] : non-tender [Abdomen Mass (___ Cm)] : no abdominal mass palpated [Abnormal Walk] : normal gait [Gait - Sufficient For Exercise Testing] : the gait was sufficient for exercise testing [Nail Clubbing] : no clubbing of the fingernails [Cyanosis, Localized] : no localized cyanosis [Petechial Hemorrhages (___cm)] : no petechial hemorrhages [Skin Color & Pigmentation] : normal skin color and pigmentation [] : no rash [No Venous Stasis] : no venous stasis [Skin Lesions] : no skin lesions [No Skin Ulcers] : no skin ulcer [No Xanthoma] : no  xanthoma was observed [Oriented To Time, Place, And Person] : oriented to person, place, and time [Affect] : the affect was normal [Mood] : the mood was normal [No Anxiety] : not feeling anxious

## 2021-07-03 ENCOUNTER — RX RENEWAL (OUTPATIENT)
Age: 66
End: 2021-07-03

## 2022-03-01 ENCOUNTER — APPOINTMENT (OUTPATIENT)
Dept: CARDIOLOGY | Facility: CLINIC | Age: 67
End: 2022-03-01

## 2022-04-01 ENCOUNTER — APPOINTMENT (OUTPATIENT)
Dept: CARDIOLOGY | Facility: CLINIC | Age: 67
End: 2022-04-01
Payer: MEDICARE

## 2022-04-01 ENCOUNTER — NON-APPOINTMENT (OUTPATIENT)
Age: 67
End: 2022-04-01

## 2022-04-01 VITALS
DIASTOLIC BLOOD PRESSURE: 80 MMHG | BODY MASS INDEX: 25.76 KG/M2 | TEMPERATURE: 98 F | SYSTOLIC BLOOD PRESSURE: 120 MMHG | WEIGHT: 170 LBS | OXYGEN SATURATION: 96 % | HEIGHT: 68 IN | HEART RATE: 81 BPM

## 2022-04-01 PROCEDURE — 99212 OFFICE O/P EST SF 10 MIN: CPT | Mod: 25

## 2022-04-01 PROCEDURE — 93015 CV STRESS TEST SUPVJ I&R: CPT

## 2022-04-01 NOTE — DISCUSSION/SUMMARY
[FreeTextEntry1] : The patient continues to do extremely well clinically.  There have been no cardiac symptoms.  There has been no recurrence of palpitations or known cardiac arrhythmias.  The patient has had no symptoms of chest discomfort or shortness of breath.  He is back to full exercise.  He did well on today's treadmill test there was no evidence of exercise-induced myocardial ischemia or arrhythmias.  Based on my evaluation I believe the patient's cardiovascular status is stable.  The current medications will be continued follow-up in 1 year.

## 2022-04-07 ENCOUNTER — RX RENEWAL (OUTPATIENT)
Age: 67
End: 2022-04-07

## 2022-12-21 ENCOUNTER — APPOINTMENT (OUTPATIENT)
Dept: GASTROENTEROLOGY | Facility: CLINIC | Age: 67
End: 2022-12-21

## 2022-12-21 VITALS
SYSTOLIC BLOOD PRESSURE: 118 MMHG | BODY MASS INDEX: 25.31 KG/M2 | HEART RATE: 76 BPM | OXYGEN SATURATION: 97 % | DIASTOLIC BLOOD PRESSURE: 80 MMHG | RESPIRATION RATE: 16 BRPM | TEMPERATURE: 98.6 F | HEIGHT: 68 IN | WEIGHT: 167 LBS

## 2022-12-21 DIAGNOSIS — K92.1 MELENA: ICD-10-CM

## 2022-12-21 PROCEDURE — 99214 OFFICE O/P EST MOD 30 MIN: CPT

## 2022-12-21 NOTE — ASSESSMENT
[FreeTextEntry1] : Rectal bleeding\par - Schedule colonoscopy with Dr. Porras.\par - Explained the risks (including but not limited to cardiopulmonary anesthetic complications, bleeding, perforation, aspiration, missed lesions and misidentified sites of lesions - complications that might necessitate hospitalization or surgery), benefits and alternatives of colonoscopy were reviewed with the patient. Preparation for the procedure was reviewed with the patient. The patient was informed that she/he would be given intravenous anesthesia by an anesthesiologist for the procedure. The patient was informed that a family member or friend must drive the patient following recovery from the procedure. Patient understands and agrees, all questions answered.\par \par

## 2022-12-21 NOTE — HISTORY OF PRESENT ILLNESS
[FreeTextEntry1] : Mr. ROBYN BAIRD is a 67 year old male with a PMH of sleep apnea, woods's esophagus (on PPI), HTN, HLD. \par PSH: atrial flutter ablation (2020). \par \par Presents for hematochezia x 3 days. \par Went to UC last night and had blood work and NELIDA. \par Guaiac positive and labs normal. \par HGB 15.1.\par Patient due for colonoscopy with Dr. Porras. \par No BM today. \par Denies rectal pain when passing stools. \par There was bright red blood in the toilet bowl and on toilet paper. \par Denies dizziness or lightheadedness. \par Scheduled for a right hip replacement end of January.

## 2023-01-01 ENCOUNTER — NON-APPOINTMENT (OUTPATIENT)
Age: 68
End: 2023-01-01

## 2023-01-03 ENCOUNTER — APPOINTMENT (OUTPATIENT)
Dept: CARDIOLOGY | Facility: CLINIC | Age: 68
End: 2023-01-03
Payer: MEDICARE

## 2023-01-03 ENCOUNTER — NON-APPOINTMENT (OUTPATIENT)
Age: 68
End: 2023-01-03

## 2023-01-03 VITALS
DIASTOLIC BLOOD PRESSURE: 82 MMHG | RESPIRATION RATE: 18 BRPM | HEIGHT: 68 IN | WEIGHT: 168 LBS | SYSTOLIC BLOOD PRESSURE: 112 MMHG | HEART RATE: 72 BPM | BODY MASS INDEX: 25.46 KG/M2 | TEMPERATURE: 98 F | OXYGEN SATURATION: 98 %

## 2023-01-03 DIAGNOSIS — K22.70 BARRETT'S ESOPHAGUS W/OUT DYSPLASIA: ICD-10-CM

## 2023-01-03 PROCEDURE — 99214 OFFICE O/P EST MOD 30 MIN: CPT | Mod: 25

## 2023-01-03 PROCEDURE — 93000 ELECTROCARDIOGRAM COMPLETE: CPT

## 2023-01-03 RX ORDER — DOXYCYCLINE HYCLATE 100 MG/1
100 CAPSULE ORAL
Qty: 20 | Refills: 0 | Status: ACTIVE | COMMUNITY
Start: 2022-12-07

## 2023-01-03 RX ORDER — GABAPENTIN 300 MG/1
300 CAPSULE ORAL
Qty: 90 | Refills: 0 | Status: ACTIVE | COMMUNITY
Start: 2022-11-01

## 2023-01-03 RX ORDER — ZOLPIDEM TARTRATE 10 MG/1
10 TABLET ORAL
Qty: 30 | Refills: 0 | Status: ACTIVE | COMMUNITY
Start: 2022-10-19

## 2023-01-03 RX ORDER — MELOXICAM 15 MG/1
15 TABLET ORAL
Qty: 30 | Refills: 0 | Status: ACTIVE | COMMUNITY
Start: 2022-10-27

## 2023-01-03 RX ORDER — CHLORHEXIDINE GLUCONATE, 0.12% ORAL RINSE 1.2 MG/ML
0.12 SOLUTION DENTAL
Qty: 473 | Refills: 0 | Status: ACTIVE | COMMUNITY
Start: 2022-12-13

## 2023-01-03 RX ORDER — BUPROPION HYDROCHLORIDE 75 MG/1
TABLET, FILM COATED ORAL
Refills: 0 | Status: ACTIVE | COMMUNITY

## 2023-01-03 RX ORDER — SODIUM FLUORIDE 1.1 G/100G
1.1 GEL ORAL
Qty: 56 | Refills: 0 | Status: ACTIVE | COMMUNITY
Start: 2022-08-05

## 2023-01-03 NOTE — CARDIOLOGY SUMMARY
[de-identified] : Stress test April 2022 no evidence of exercise-induced myocardial ischemia or arrhythmias. [de-identified] : Echocardiogram January 2018 normal LV function normal ejection fraction.  Right ventricle mild aortic valve MR this was a LIU)

## 2023-01-03 NOTE — REASON FOR VISIT
[FreeTextEntry1] : She comes for follow-up and preoperative evaluation.  He is anticipating a hip replacement surgery because of severe osteoarthritis.  The surgery is scheduled for January 23 at [facility] with Dr. Roberto ROMANO.\par Patient's cardiac history began with a diagnosis of persistent atrial flutter.  He underwent a successful therapeutic ablation in early 2019.  Fortunately there has been no recurrence of arrhythmia.  Additional diagnoses include hypertension and hyperlipidemia\par \par Currently the patient has had no recent cardiac symptoms his activity and mobility have been limited by the advanced osteoarthritis which requires surgical intervention\par \par There have been no symptoms of angina shortness of breath chest pain dizziness or lightheadedness.\par

## 2023-01-03 NOTE — DISCUSSION/SUMMARY
[FreeTextEntry1] : Patient's clinical condition remains entirely stable.  Blood pressure is normal at 112/82 the cardiorespiratory examination reveals slightly diminished breath sounds at the right base (this is a chronic finding which followed treatment for a pleural effusion number of years ago).\par Electrocardiogram today reveals sinus rhythm left axis deviation Minor right-sided conduction delay with no acute changes.\par Based on my examination and assessment I find the patient's condition to be stable for the procedure to go forward as planned\par \par I have reviewed the patient's medications his atenolol and losartan will be taken as usual up to and including the day of surgery.  Aspirin will be withheld for at least 1 week prior to surgery the other medications will be taken as usual as well.\par \par

## 2023-01-05 ENCOUNTER — TRANSCRIPTION ENCOUNTER (OUTPATIENT)
Age: 68
End: 2023-01-05

## 2023-01-09 ENCOUNTER — APPOINTMENT (OUTPATIENT)
Dept: GASTROENTEROLOGY | Facility: HOSPITAL | Age: 68
End: 2023-01-09

## 2023-04-05 ENCOUNTER — APPOINTMENT (OUTPATIENT)
Dept: CARDIOLOGY | Facility: CLINIC | Age: 68
End: 2023-04-05
Payer: MEDICARE

## 2023-04-05 VITALS
WEIGHT: 165.25 LBS | TEMPERATURE: 97.5 F | DIASTOLIC BLOOD PRESSURE: 84 MMHG | RESPIRATION RATE: 18 BRPM | HEIGHT: 68 IN | SYSTOLIC BLOOD PRESSURE: 126 MMHG | HEART RATE: 78 BPM | BODY MASS INDEX: 25.05 KG/M2 | OXYGEN SATURATION: 99 %

## 2023-04-05 DIAGNOSIS — I10 ESSENTIAL (PRIMARY) HYPERTENSION: ICD-10-CM

## 2023-04-05 DIAGNOSIS — I48.3 TYPICAL ATRIAL FLUTTER: ICD-10-CM

## 2023-04-05 DIAGNOSIS — E78.5 HYPERLIPIDEMIA, UNSPECIFIED: ICD-10-CM

## 2023-04-05 PROCEDURE — 99213 OFFICE O/P EST LOW 20 MIN: CPT | Mod: 25

## 2023-04-05 PROCEDURE — 93015 CV STRESS TEST SUPVJ I&R: CPT

## 2023-04-05 NOTE — REASON FOR VISIT
[FreeTextEntry1] : Patient returns today for follow-up including treadmill testing\par \par We had initially examined the patient a number of years ago because of atrial arrhythmias.  These arrhythmias which included atrial flutter resolved after successful ablations.  Presently the patient's condition is stable he has had no acute cardiac symptoms.\par \par At around the time of his arrhythmias the patient was also discovered to have a left pleural effusion.  The etiology of that effusion was never fully elucidated.

## 2023-04-05 NOTE — DISCUSSION/SUMMARY
[FreeTextEntry1] : The patient's clinical condition is stable.  There have been no recurrences of cardiac arrhythmias or other cardiac symptoms.  The patient had successful hip replacement surgery in January of this year and is now resuming full activities and exercise\par \par The cardiorespiratory examination is normal\par \par The baseline EKG reveals sinus rhythm and incomplete right bundle branch block.  The patient's treadmill test revealed no arrhythmias and no evidence of exercise-induced myocardial ischemia\par \par Based on my evaluation and assessment I find the patient's cardiovascular status to be stable.  The current medications will be continued.  Follow-up on an annual basis.

## 2023-04-17 ENCOUNTER — RESULT REVIEW (OUTPATIENT)
Age: 68
End: 2023-04-17

## 2023-04-17 NOTE — PATIENT PROFILE ADULT - NSPROHMSYMPCOND_GEN_A_NUR
BMI was above normal measurement. Current weight: 101 kg (222 lb)  Weight change since last visit (-) denotes wt loss -1 lbs   Weight loss needed to achieve BMI 25: 76.7 Lbs  Weight loss needed to achieve BMI 30: 47.6 Lbs  Advised to Increase physical activity.  
none

## 2023-04-18 ENCOUNTER — APPOINTMENT (OUTPATIENT)
Dept: GASTROENTEROLOGY | Facility: HOSPITAL | Age: 68
End: 2023-04-18

## 2023-05-31 ENCOUNTER — RX RENEWAL (OUTPATIENT)
Age: 68
End: 2023-05-31

## 2023-05-31 RX ORDER — PANTOPRAZOLE 40 MG/1
40 TABLET, DELAYED RELEASE ORAL
Qty: 90 | Refills: 3 | Status: ACTIVE | COMMUNITY
Start: 2020-06-16 | End: 1900-01-01

## 2023-07-11 ENCOUNTER — APPOINTMENT (OUTPATIENT)
Dept: GASTROENTEROLOGY | Facility: HOSPITAL | Age: 68
End: 2023-07-11

## 2023-09-25 ENCOUNTER — RESULT REVIEW (OUTPATIENT)
Age: 68
End: 2023-09-25

## 2023-09-26 ENCOUNTER — APPOINTMENT (OUTPATIENT)
Dept: GASTROENTEROLOGY | Facility: HOSPITAL | Age: 68
End: 2023-09-26

## 2023-09-29 ENCOUNTER — NON-APPOINTMENT (OUTPATIENT)
Age: 68
End: 2023-09-29

## 2024-04-09 ENCOUNTER — APPOINTMENT (OUTPATIENT)
Dept: CARDIOLOGY | Facility: CLINIC | Age: 69
End: 2024-04-09
